# Patient Record
Sex: MALE | Race: BLACK OR AFRICAN AMERICAN | NOT HISPANIC OR LATINO | Employment: FULL TIME | URBAN - METROPOLITAN AREA
[De-identification: names, ages, dates, MRNs, and addresses within clinical notes are randomized per-mention and may not be internally consistent; named-entity substitution may affect disease eponyms.]

---

## 2017-10-19 ENCOUNTER — HISTORICAL (OUTPATIENT)
Dept: ADMINISTRATIVE | Facility: HOSPITAL | Age: 52
End: 2017-10-19

## 2018-01-30 LAB
INFLUENZA A ANTIGEN, POC: NEGATIVE
INFLUENZA B ANTIGEN, POC: NEGATIVE
RAPID GROUP A STREP (OHS): NEGATIVE

## 2018-03-24 LAB — RAPID GROUP A STREP (OHS): NEGATIVE

## 2020-07-06 ENCOUNTER — HISTORICAL (OUTPATIENT)
Dept: URGENT CARE | Facility: CLINIC | Age: 55
End: 2020-07-06

## 2022-04-10 ENCOUNTER — HISTORICAL (OUTPATIENT)
Dept: ADMINISTRATIVE | Facility: HOSPITAL | Age: 57
End: 2022-04-10

## 2022-04-27 VITALS
WEIGHT: 237.88 LBS | BODY MASS INDEX: 32.22 KG/M2 | DIASTOLIC BLOOD PRESSURE: 81 MMHG | SYSTOLIC BLOOD PRESSURE: 110 MMHG | HEIGHT: 72 IN | OXYGEN SATURATION: 97 %

## 2022-05-03 ENCOUNTER — HOSPITAL ENCOUNTER (OUTPATIENT)
Facility: HOSPITAL | Age: 57
Discharge: HOME | End: 2022-05-05
Attending: STUDENT IN AN ORGANIZED HEALTH CARE EDUCATION/TRAINING PROGRAM | Admitting: INTERNAL MEDICINE
Payer: COMMERCIAL

## 2022-05-03 DIAGNOSIS — I10 HYPERTENSION, UNSPECIFIED TYPE: ICD-10-CM

## 2022-05-03 DIAGNOSIS — R10.11 RUQ PAIN: ICD-10-CM

## 2022-05-03 DIAGNOSIS — R11.0 NAUSEA: ICD-10-CM

## 2022-05-03 DIAGNOSIS — T46.0X1A POISONING BY DIGOXIN, ACCIDENTAL OR UNINTENTIONAL, INITIAL ENCOUNTER: ICD-10-CM

## 2022-05-03 DIAGNOSIS — R07.9 CHEST PAIN: ICD-10-CM

## 2022-05-03 DIAGNOSIS — R10.11 RIGHT UPPER QUADRANT ABDOMINAL PAIN: Primary | ICD-10-CM

## 2022-05-03 DIAGNOSIS — T46.0X1A DIGOXIN TOXICITY: ICD-10-CM

## 2022-05-03 LAB
ALBUMIN SERPL-MCNC: 4.2 GM/DL (ref 3.5–5)
ALBUMIN/GLOB SERPL: 1.4 RATIO (ref 1.1–2)
ALP SERPL-CCNC: 40 UNIT/L (ref 40–150)
ALT SERPL-CCNC: 23 UNIT/L (ref 0–55)
APPEARANCE UR: CLEAR
AST SERPL-CCNC: 21 UNIT/L (ref 5–34)
BACTERIA #/AREA URNS AUTO: NORMAL /HPF
BASOPHILS # BLD AUTO: 0.03 X10(3)/MCL (ref 0–0.2)
BASOPHILS NFR BLD AUTO: 0.4 %
BILIRUB UR QL STRIP.AUTO: NEGATIVE MG/DL
BILIRUBIN DIRECT+TOT PNL SERPL-MCNC: 0.2 MG/DL (ref 0–0.5)
BILIRUBIN DIRECT+TOT PNL SERPL-MCNC: 0.3 MG/DL (ref 0–0.8)
BILIRUBIN DIRECT+TOT PNL SERPL-MCNC: 0.5 MG/DL
BUN SERPL-MCNC: 11.7 MG/DL (ref 8.4–25.7)
CALCIUM SERPL-MCNC: 9.5 MG/DL (ref 8.4–10.2)
CHLORIDE SERPL-SCNC: 107 MMOL/L (ref 98–107)
CO2 SERPL-SCNC: 28 MMOL/L (ref 22–29)
COLOR UR AUTO: YELLOW
CREAT SERPL-MCNC: 1.19 MG/DL (ref 0.73–1.18)
DIGOXIN SERPL-MCNC: 4.6 NG/ML (ref 0.8–2)
EOSINOPHIL # BLD AUTO: 0.31 X10(3)/MCL (ref 0–0.9)
EOSINOPHIL NFR BLD AUTO: 4.2 %
ERYTHROCYTE [DISTWIDTH] IN BLOOD BY AUTOMATED COUNT: 12.5 % (ref 11.5–17)
GLOBULIN SER-MCNC: 3 GM/DL (ref 2.4–3.5)
GLUCOSE SERPL-MCNC: 96 MG/DL (ref 74–100)
GLUCOSE UR QL STRIP.AUTO: NEGATIVE MG/DL
HCT VFR BLD AUTO: 39.2 % (ref 42–52)
HGB BLD-MCNC: 12.5 GM/DL (ref 14–18)
IMM GRANULOCYTES # BLD AUTO: 0.06 X10(3)/MCL (ref 0–0.02)
IMM GRANULOCYTES NFR BLD AUTO: 0.8 % (ref 0–0.43)
KETONES UR QL STRIP.AUTO: NEGATIVE MG/DL
LEUKOCYTE ESTERASE UR QL STRIP.AUTO: NEGATIVE UNIT/L
LIPASE SERPL-CCNC: 37 U/L
LYMPHOCYTES # BLD AUTO: 2.37 X10(3)/MCL (ref 0.6–4.6)
LYMPHOCYTES NFR BLD AUTO: 32.4 %
MCH RBC QN AUTO: 30.7 PG (ref 27–31)
MCHC RBC AUTO-ENTMCNC: 31.9 MG/DL (ref 33–36)
MCV RBC AUTO: 96.3 FL (ref 80–94)
MONOCYTES # BLD AUTO: 0.51 X10(3)/MCL (ref 0.1–1.3)
MONOCYTES NFR BLD AUTO: 7 %
NEUTROPHILS # BLD AUTO: 4 X10(3)/MCL (ref 2.1–9.2)
NEUTROPHILS NFR BLD AUTO: 55.2 %
NITRITE UR QL STRIP.AUTO: NEGATIVE
NRBC BLD AUTO-RTO: 0 %
PH UR STRIP.AUTO: 5.5 [PH]
PLATELET # BLD AUTO: 268 X10(3)/MCL (ref 130–400)
PMV BLD AUTO: 10.8 FL (ref 9.4–12.4)
POTASSIUM SERPL-SCNC: 4.2 MMOL/L (ref 3.5–5.1)
PROT SERPL-MCNC: 7.2 GM/DL (ref 6.4–8.3)
PROT UR QL STRIP.AUTO: NEGATIVE MG/DL
RBC # BLD AUTO: 4.07 X10(6)/MCL (ref 4.7–6.1)
RBC #/AREA URNS AUTO: 0 /HPF
RBC UR QL AUTO: NEGATIVE UNIT/L
SODIUM SERPL-SCNC: 140 MMOL/L (ref 136–145)
SP GR UR STRIP.AUTO: 1.01 (ref 1–1.03)
SQUAMOUS #/AREA URNS AUTO: 0 /LPF
TROPONIN I SERPL-MCNC: <0.01 NG/ML (ref 0–0.04)
TROPONIN I SERPL-MCNC: <0.01 NG/ML (ref 0–0.04)
UROBILINOGEN UR STRIP-ACNC: 0.2 MG/DL
WBC # SPEC AUTO: 7.3 X10(3)/MCL (ref 4.5–11.5)
WBC #/AREA URNS AUTO: 0 /HPF

## 2022-05-03 PROCEDURE — G0378 HOSPITAL OBSERVATION PER HR: HCPCS

## 2022-05-03 PROCEDURE — 25000003 PHARM REV CODE 250

## 2022-05-03 PROCEDURE — 93010 ELECTROCARDIOGRAM REPORT: CPT | Mod: ,,, | Performed by: INTERNAL MEDICINE

## 2022-05-03 PROCEDURE — 84484 ASSAY OF TROPONIN QUANT: CPT | Performed by: PHYSICIAN ASSISTANT

## 2022-05-03 PROCEDURE — 81003 URINALYSIS AUTO W/O SCOPE: CPT | Performed by: NURSE PRACTITIONER

## 2022-05-03 PROCEDURE — 80053 COMPREHEN METABOLIC PANEL: CPT | Performed by: NURSE PRACTITIONER

## 2022-05-03 PROCEDURE — 93005 ELECTROCARDIOGRAM TRACING: CPT

## 2022-05-03 PROCEDURE — 25000003 PHARM REV CODE 250: Performed by: PHYSICIAN ASSISTANT

## 2022-05-03 PROCEDURE — 83690 ASSAY OF LIPASE: CPT | Performed by: NURSE PRACTITIONER

## 2022-05-03 PROCEDURE — 99285 EMERGENCY DEPT VISIT HI MDM: CPT | Mod: 25

## 2022-05-03 PROCEDURE — 81015 MICROSCOPIC EXAM OF URINE: CPT | Performed by: NURSE PRACTITIONER

## 2022-05-03 PROCEDURE — 36415 COLL VENOUS BLD VENIPUNCTURE: CPT | Performed by: NURSE PRACTITIONER

## 2022-05-03 PROCEDURE — 11000001 HC ACUTE MED/SURG PRIVATE ROOM

## 2022-05-03 PROCEDURE — 93010 EKG 12-LEAD: ICD-10-PCS | Mod: ,,, | Performed by: INTERNAL MEDICINE

## 2022-05-03 PROCEDURE — 85025 COMPLETE CBC W/AUTO DIFF WBC: CPT | Performed by: NURSE PRACTITIONER

## 2022-05-03 PROCEDURE — 80162 ASSAY OF DIGOXIN TOTAL: CPT | Performed by: PHYSICIAN ASSISTANT

## 2022-05-03 RX ORDER — ONDANSETRON 2 MG/ML
4 INJECTION INTRAMUSCULAR; INTRAVENOUS
Status: ACTIVE | OUTPATIENT
Start: 2022-05-03 | End: 2022-05-04

## 2022-05-03 RX ORDER — ONDANSETRON 2 MG/ML
4 INJECTION INTRAMUSCULAR; INTRAVENOUS EVERY 8 HOURS PRN
Status: DISCONTINUED | OUTPATIENT
Start: 2022-05-03 | End: 2022-05-05 | Stop reason: HOSPADM

## 2022-05-03 RX ORDER — LISINOPRIL AND HYDROCHLOROTHIAZIDE 12.5; 2 MG/1; MG/1
1 TABLET ORAL DAILY
COMMUNITY
Start: 2022-04-15

## 2022-05-03 RX ORDER — ACETAMINOPHEN 325 MG/1
650 TABLET ORAL EVERY 8 HOURS PRN
Status: DISCONTINUED | OUTPATIENT
Start: 2022-05-03 | End: 2022-05-05 | Stop reason: HOSPADM

## 2022-05-03 RX ORDER — SUCRALFATE 1 G/1
1 TABLET ORAL 4 TIMES DAILY
Qty: 16 TABLET | Refills: 0 | Status: SHIPPED | OUTPATIENT
Start: 2022-05-03 | End: 2022-05-03

## 2022-05-03 RX ORDER — ONDANSETRON 4 MG/1
TABLET, ORALLY DISINTEGRATING ORAL
Status: COMPLETED
Start: 2022-05-03 | End: 2022-05-03

## 2022-05-03 RX ORDER — DIGOXIN 250 MCG
0.25 TABLET ORAL DAILY
Status: ON HOLD | COMMUNITY
Start: 2022-04-15 | End: 2022-05-05 | Stop reason: HOSPADM

## 2022-05-03 RX ORDER — HYDROCODONE BITARTRATE AND ACETAMINOPHEN 5; 325 MG/1; MG/1
1 TABLET ORAL EVERY 4 HOURS PRN
Status: DISCONTINUED | OUTPATIENT
Start: 2022-05-03 | End: 2022-05-05 | Stop reason: HOSPADM

## 2022-05-03 RX ORDER — LISINOPRIL 10 MG/1
20 TABLET ORAL
Status: COMPLETED | OUTPATIENT
Start: 2022-05-03 | End: 2022-05-03

## 2022-05-03 RX ORDER — PANTOPRAZOLE SODIUM 40 MG/1
TABLET, DELAYED RELEASE ORAL
Status: COMPLETED
Start: 2022-05-03 | End: 2022-05-03

## 2022-05-03 RX ORDER — ONDANSETRON 4 MG/1
4 TABLET, FILM COATED ORAL EVERY 6 HOURS
Qty: 12 TABLET | Refills: 0 | Status: SHIPPED | OUTPATIENT
Start: 2022-05-03 | End: 2022-05-03

## 2022-05-03 RX ORDER — SODIUM CHLORIDE 0.9 % (FLUSH) 0.9 %
10 SYRINGE (ML) INJECTION
Status: DISCONTINUED | OUTPATIENT
Start: 2022-05-03 | End: 2022-05-05 | Stop reason: HOSPADM

## 2022-05-03 RX ORDER — HYDROCHLOROTHIAZIDE 25 MG/1
25 TABLET ORAL
Status: COMPLETED | OUTPATIENT
Start: 2022-05-03 | End: 2022-05-03

## 2022-05-03 RX ORDER — PANTOPRAZOLE SODIUM 40 MG/10ML
40 INJECTION, POWDER, LYOPHILIZED, FOR SOLUTION INTRAVENOUS
Status: ACTIVE | OUTPATIENT
Start: 2022-05-03 | End: 2022-05-04

## 2022-05-03 RX ORDER — RANOLAZINE 1000 MG/1
1000 TABLET, EXTENDED RELEASE ORAL 2 TIMES DAILY
COMMUNITY
Start: 2022-04-16

## 2022-05-03 RX ORDER — PROMETHAZINE HYDROCHLORIDE 25 MG/1
25 TABLET ORAL EVERY 6 HOURS PRN
Status: DISCONTINUED | OUTPATIENT
Start: 2022-05-03 | End: 2022-05-05 | Stop reason: HOSPADM

## 2022-05-03 RX ORDER — PANTOPRAZOLE SODIUM 40 MG/1
40 TABLET, DELAYED RELEASE ORAL
Status: COMPLETED | OUTPATIENT
Start: 2022-05-03 | End: 2022-05-03

## 2022-05-03 RX ORDER — ATORVASTATIN CALCIUM 20 MG/1
TABLET, FILM COATED ORAL
COMMUNITY

## 2022-05-03 RX ORDER — TALC
6 POWDER (GRAM) TOPICAL NIGHTLY PRN
Status: DISCONTINUED | OUTPATIENT
Start: 2022-05-03 | End: 2022-05-05 | Stop reason: HOSPADM

## 2022-05-03 RX ADMIN — HYDROCHLOROTHIAZIDE 25 MG: 25 TABLET ORAL at 06:05

## 2022-05-03 RX ADMIN — PANTOPRAZOLE SODIUM 40 MG: 40 TABLET, DELAYED RELEASE ORAL at 03:05

## 2022-05-03 RX ADMIN — LISINOPRIL 20 MG: 10 TABLET ORAL at 06:05

## 2022-05-03 RX ADMIN — ONDANSETRON 4 MG: 4 TABLET, ORALLY DISINTEGRATING ORAL at 03:05

## 2022-05-03 NOTE — ED PROVIDER NOTES
Encounter Date: 5/3/2022       History     Chief Complaint   Patient presents with    Abdominal Pain     Pt to ER via POV for abd pain.  Started 6 months ago.  No other complaints     Patient states abdominal pain x 4-5 months. States nausea. Denies any vomiting or diarrhea (sima, GERALDINE).     Clint ARIAS PA-C, assume care of the patient. 57 yo male presents to ED for evaluation for RUQ/rib pain for the past 5-6 months. Reports intermittent nausea without vomiting or diarrhea. States been unable to take his meds due to nausea. Hx of HTN and Afib currently on digoxin.     The history is provided by the patient.     Review of patient's allergies indicates:  No Known Allergies  No past medical history on file.  No past surgical history on file.  No family history on file.     Review of Systems   Constitutional: Negative for chills, fatigue, fever and unexpected weight change.   HENT: Negative.    Respiratory: Negative for choking, chest tightness and shortness of breath.    Cardiovascular: Positive for chest pain. Negative for palpitations.   Gastrointestinal: Positive for abdominal pain and vomiting. Negative for constipation and diarrhea.   Genitourinary: Negative for dysuria, frequency and hematuria.   Musculoskeletal: Positive for arthralgias. Negative for back pain, joint swelling and myalgias.   Skin: Negative.    Neurological: Negative for dizziness, light-headedness and headaches.       Physical Exam     Initial Vitals [05/03/22 0924]   BP Pulse Resp Temp SpO2   (!) 157/95 60 18 97.9 °F (36.6 °C) 99 %      MAP       --         Physical Exam    Constitutional: He appears well-developed and well-nourished.   HENT:   Head: Normocephalic and atraumatic.   Eyes: Conjunctivae and EOM are normal. Pupils are equal, round, and reactive to light.   Neck: Neck supple.   Normal range of motion.  Cardiovascular: Normal rate, regular rhythm and normal heart sounds.   Pulmonary/Chest: He exhibits no mass, no deformity, no  swelling and no retraction.   Right lower chest wall pain on palpation.   Abdominal: Abdomen is soft. Bowel sounds are normal. There is abdominal tenderness in the right upper quadrant. There is no rebound, no guarding and negative Ring's sign.   Musculoskeletal:         General: Normal range of motion.      Cervical back: Normal range of motion and neck supple.     Neurological: He is alert. GCS score is 15. GCS eye subscore is 4. GCS verbal subscore is 5. GCS motor subscore is 6.   Skin: Skin is warm and dry.         ED Course   Procedures  Labs Reviewed   COMPREHENSIVE METABOLIC PANEL - Abnormal; Notable for the following components:       Result Value    Creatinine 1.19 (*)     All other components within normal limits   CBC WITH DIFFERENTIAL - Abnormal; Notable for the following components:    RBC 4.07 (*)     Hgb 12.5 (*)     Hct 39.2 (*)     MCV 96.3 (*)     MCHC 31.9 (*)     IG# 0.06 (*)     IG% 0.8 (*)     All other components within normal limits   DIGOXIN LEVEL - Abnormal; Notable for the following components:    Digoxin Level 4.60 (*)     All other components within normal limits   LIPASE - Normal   URINALYSIS, REFLEX TO URINE CULTURE - Normal   TROPONIN I - Normal   CBC W/ AUTO DIFFERENTIAL    Narrative:     The following orders were created for panel order CBC Auto Differential.  Procedure                               Abnormality         Status                     ---------                               -----------         ------                     CBC with Differential[[id number]]        Abnormal            Final result                 Please view results for these tests on the individual orders.   URINALYSIS, MICROSCOPIC     EKG Readings: (Independently Interpreted)   Initial Reading: No STEMI. Previous EKG: Compared with most recent EKG Rhythm: Sinus Bradycardia. Ectopy: No Ectopy. Conduction: 1st Degree AV Block. ST Segments: Normal ST Segments. T Waves: Normal. Clinical Impression: Sinus  Bradycardia and AV Block - 1st Degree       Imaging Results          US Abdomen Limited (Final result)  Result time 05/03/22 15:44:25    Final result by Chito Hernandez MD (05/03/22 15:44:25)                 Impression:      No sonographic abnormality of the right upper quadrant.      Electronically signed by: Chito Hernandez  Date:    05/03/2022  Time:    15:44             Narrative:    EXAMINATION:  US ABDOMEN LIMITED    CLINICAL HISTORY:  RUQ pain;    COMPARISON:  No previous studies are available for comparison.    FINDINGS:  Grayscale, color and spectral doppler evaluation of the right upper quadrant.    The pancreas is obscured.  Imaged portions of aorta and IVC normal in caliber.    Liver is not significantly enlarged. No focal liver lesion. Normal hepatopetal flow is noted in the portal vein.    No gallstones. No significant gallbladder wall thickening or pericholecystic fluid.  The common bile duct is normal in caliber  and measures 3 mm.    Right kidney measures 12 cm in length. No hydronephrosis.                               X-Ray Chest 1 View (Final result)  Result time 05/03/22 15:09:21    Final result by Yecenia Camejo MD (05/03/22 15:09:21)                 Impression:      No acute abnormality identified.      Electronically signed by: Yecenia Camejo  Date:    05/03/2022  Time:    15:09             Narrative:    EXAMINATION:  XR CHEST 1 VIEW    CLINICAL HISTORY:  Chest pain, unspecified    COMPARISON:  None.    FINDINGS:  The heart is normal in size.  The lungs are clear without focal consolidation.  There is no pleural effusion or visible pneumothorax.                                 Medications   ondansetron injection 4 mg (4 mg Intravenous Not Given 5/3/22 1430)   pantoprazole injection 40 mg (40 mg Intravenous Not Given 5/3/22 1430)   ondansetron (ZOFRAN-ODT) 4 MG disintegrating tablet (4 mg Oral Given 5/3/22 1551)   pantoprazole EC tablet 40 mg (40 mg Oral Given 5/3/22 1553)   lisinopriL  tablet 20 mg (20 mg Oral Given 5/3/22 1815)   hydroCHLOROthiazide tablet 25 mg (25 mg Oral Given 5/3/22 1815)     Medical Decision Making:   Clinical Tests:   Lab Tests: Ordered and Reviewed  The following lab test(s) were unremarkable: CBC, CMP, Troponin, Lipase and Urinalysis       <> Summary of Lab: Basic labs unremarkable. Digoxin level of 4.6 critical high.  Radiological Study: Ordered and Reviewed  Medical Tests: Ordered and Reviewed             ED Course as of 05/03/22 1859 Tue May 03, 2022   1757 Digoxin Lvl(!!): 4.60  Discussed with Dr. Jones, ED attending, critical digoxin labs. Possible cause of symptoms. Recommends to discuss with Dr. Schulz, cardiology for recommendations. [SL]   1821 Discussed with GERALDINE Stack, with cardiology. Recommends holding digoxin. Admit to medicine with possible HIDA scan in AM. Will see in consult.  [SL]   1829 Discussed with Dr. Bae. Will admit. Get HIDA scan in AM.  [SL]      ED Course User Index  [SL] CARMEN Orozco             Clinical Impression:   Final diagnoses:  [R07.9] Chest pain  [I10] Hypertension, unspecified type (Primary)  [R11.0] Nausea  [R10.11] RUQ pain  [T46.0X1A] Poisoning by digoxin, accidental or unintentional, initial encounter  [T46.0X1A] Digoxin toxicity          ED Disposition Condition    Admit               CARMEN Orozco  05/03/22 1912

## 2022-05-04 PROBLEM — R10.11 RIGHT UPPER QUADRANT ABDOMINAL PAIN: Status: ACTIVE | Noted: 2022-05-04

## 2022-05-04 LAB
BASOPHILS # BLD AUTO: 0.03 X10(3)/MCL (ref 0–0.2)
BASOPHILS NFR BLD AUTO: 0.4 %
EOSINOPHIL # BLD AUTO: 0.35 X10(3)/MCL (ref 0–0.9)
EOSINOPHIL NFR BLD AUTO: 4.8 %
ERYTHROCYTE [DISTWIDTH] IN BLOOD BY AUTOMATED COUNT: 12.9 % (ref 11.5–17)
HCT VFR BLD AUTO: 42 % (ref 42–52)
HGB BLD-MCNC: 13.3 GM/DL (ref 14–18)
IMM GRANULOCYTES # BLD AUTO: 0.05 X10(3)/MCL (ref 0–0.02)
IMM GRANULOCYTES NFR BLD AUTO: 0.7 % (ref 0–0.43)
LYMPHOCYTES # BLD AUTO: 2.22 X10(3)/MCL (ref 0.6–4.6)
LYMPHOCYTES NFR BLD AUTO: 30.5 %
MCH RBC QN AUTO: 31.1 PG (ref 27–31)
MCHC RBC AUTO-ENTMCNC: 31.7 MG/DL (ref 33–36)
MCV RBC AUTO: 98.1 FL (ref 80–94)
MONOCYTES # BLD AUTO: 0.58 X10(3)/MCL (ref 0.1–1.3)
MONOCYTES NFR BLD AUTO: 8 %
NEUTROPHILS # BLD AUTO: 4.1 X10(3)/MCL (ref 2.1–9.2)
NEUTROPHILS NFR BLD AUTO: 55.6 %
NRBC BLD AUTO-RTO: 0 %
PLATELET # BLD AUTO: 252 X10(3)/MCL (ref 130–400)
PMV BLD AUTO: 11.5 FL (ref 9.4–12.4)
RBC # BLD AUTO: 4.28 X10(6)/MCL (ref 4.7–6.1)
TROPONIN I SERPL-MCNC: <0.01 NG/ML (ref 0–0.04)
WBC # SPEC AUTO: 7.3 X10(3)/MCL (ref 4.5–11.5)

## 2022-05-04 PROCEDURE — 85025 COMPLETE CBC W/AUTO DIFF WBC: CPT | Performed by: PHYSICIAN ASSISTANT

## 2022-05-04 PROCEDURE — 36415 COLL VENOUS BLD VENIPUNCTURE: CPT | Performed by: PHYSICIAN ASSISTANT

## 2022-05-04 PROCEDURE — 25000003 PHARM REV CODE 250: Performed by: INTERNAL MEDICINE

## 2022-05-04 PROCEDURE — G0378 HOSPITAL OBSERVATION PER HR: HCPCS

## 2022-05-04 PROCEDURE — 11000001 HC ACUTE MED/SURG PRIVATE ROOM

## 2022-05-04 PROCEDURE — 84484 ASSAY OF TROPONIN QUANT: CPT | Performed by: PHYSICIAN ASSISTANT

## 2022-05-04 RX ORDER — ATORVASTATIN CALCIUM 40 MG/1
40 TABLET, FILM COATED ORAL NIGHTLY
Status: DISCONTINUED | OUTPATIENT
Start: 2022-05-04 | End: 2022-05-05 | Stop reason: HOSPADM

## 2022-05-04 RX ORDER — RANOLAZINE 500 MG/1
1000 TABLET, EXTENDED RELEASE ORAL 2 TIMES DAILY
Status: DISCONTINUED | OUTPATIENT
Start: 2022-05-04 | End: 2022-05-05 | Stop reason: HOSPADM

## 2022-05-04 RX ORDER — NAPROXEN SODIUM 220 MG/1
81 TABLET, FILM COATED ORAL DAILY
Status: DISCONTINUED | OUTPATIENT
Start: 2022-05-04 | End: 2022-05-05 | Stop reason: HOSPADM

## 2022-05-04 RX ADMIN — ATORVASTATIN CALCIUM 40 MG: 40 TABLET, FILM COATED ORAL at 08:05

## 2022-05-04 RX ADMIN — RANOLAZINE 1000 MG: 500 TABLET, EXTENDED RELEASE ORAL at 12:05

## 2022-05-04 RX ADMIN — ASPIRIN 81 MG CHEWABLE TABLET 81 MG: 81 TABLET CHEWABLE at 12:05

## 2022-05-04 RX ADMIN — RANOLAZINE 1000 MG: 500 TABLET, EXTENDED RELEASE ORAL at 08:05

## 2022-05-04 NOTE — ED NOTES
"Assumed care for pt at this time. Pt presents to ed with RUQ abd pain that started 6 months ago. Pt states he has hx of A fib, states he takes digoxin. Pt states his "normal HR is lower 50s". Pt in NAD, AAOx4. Family at bedside. Cardiac-respiratory monitors in progress  "

## 2022-05-04 NOTE — HISTORICAL [facility]
This is a historical note converted from Charles. Formatting and pictures may have been removed.  Please reference Charles for original formatting and attached multimedia. Chief Complaint  doing leg press & felt a pop on Rt Flank x 4 days  History of Present Illness  Patient is a 52-year-old male doing a leg press?felt and felt?pop in his right flank  Review of Systems  Constitutional_no fever, fatigue, weakness  Cardiovascular_no chest pain, tachycardia, bradycardia, arrhythmia  Respiratory_no shortness of breath, cough, wheezing, rhonchi  Musculoskeletal_[right flank pain]  Integumentary_no skin rash or abnormal lesion  Neurologic_no headache, no dizziness, no weakness or numbness  ?  Physical Exam  Vitals & Measurements  T:?36.7? ?C ?(Oral)? HR:?65?(Peripheral)? RR:?18? BP:?145/97? SpO2:?96%?  HT:?182?cm? HT:?182?cm? WT:?106.5?kg? WT:?106.5?kg? BMI:?32.15?  VITAL SIGNS: ?Reviewed. ? ?  GENERAL:? In no apparent distress.?  CHEST:? Chest with clear breath sounds bilaterally.? No wheezes, rales, or rhonchi.?  CARDIAC:? Regular rate and rhythm.? S1 and S2, without murmurs, gallops, or rubs.  ABDOMEN:? Soft, without detectable tenderness.? No sign of distention.? No?? rebound or guarding, and no masses palpated.?? Bowel Sounds normal.  MUSCULOSKELETAL: Right rib pain with?increased respiration and palpation near the 11-12?rib area all major joints. Extremities without clubbing, cyanosis or edema.?  NEUROLOGIC EXAM:? Alert and oriented x 3.? No focal sensory or strength deficits.?? Speech normal.? Follows commands.  Assessment/Plan  1.?Rib pain on right side  Ordered:  diclofenac, 75 mg = 1 tab(s), Oral, BID, # 14 tab(s), 0 Refill(s), Pharmacy: [facility]/pharmacy #[id number]  ketorolac, 60 mg, IM, Once-Unscheduled, Order duration: 5 day(s), first dose 10/19/17 8:54:00 CDT, stop date 10/24/17 8:53:00 CDT, 160,101  XR Ribs Right 2 Views, Routine, 10/19/17 8:32:00 CDT, Trauma, None, Ambulatory, Rad Type, Rib pain on right side, Not  Scheduled, 10/19/17 8:32:00 CDT  ?  2.?Constipation  ?  3.?Costochondritis  ?  Instructed patient to take diclofenac 75 mg p.o. twice daily??7 days, if symptom persists?follow-up with PCP or return to clinic.  Instructed patient to take?MiraLAX 1 capful with 8 ounces of water twice daily??1 week,?starting Saturday?drink one bottle of magnesium citrate?weight 3 hours if no response repeat again until?patient has?a large amount of?evacuated stool.  Instructed patient symptom persists greater than 5 7 days return to clinic or follow-up with PCP.   Problem List/Past Medical History  Ongoing  A-fib  HTN - Hypertension  Obesity  Historical  Procedure/Surgical History  None  Medications  ATORVASTATIN 20 MG TABLET, 20 mg= 1 tab(s), Oral, Daily  diclofenac sodium 75 mg oral delayed release tablet, 75 mg= 1 tab(s), Oral, BID  DIGOXIN 250 MCG TABLET, 250 mcg= 1 tab(s), Oral, Daily  LISINOPRIL-HCTZ 20-12.5 MG TAB, 1 tab(s), Oral, Daily  TIZANIDINE HCL 4 MG TABLET  Toradol, 60 mg, IM, Once-Unscheduled  Allergies  No Known Medication Allergies  Social History  Alcohol - 10/19/2017  Current, 1-2 times per month  Tobacco - 10/19/2017  Never smoker  Family History  Brain tumor.: Mother.  Prostate cancer.: Father.  Tobacco user.: Mother and Father.

## 2022-05-04 NOTE — H&P
[facility] - [facility]    History & Physical      Patient Name: Sudheer Patricia  MRN: [id number]  Admission Date: 5/3/2022  Attending Physician: Catalino Love MD   Primary Care Provider: Primary Doctor No         Patient information was obtained from patient and ER records.     Subjective:     Principal Problem:Right upper quadrant abdominal pain    Chief Complaint:   Chief Complaint   Patient presents with    Abdominal Pain     Pt to ER via POV for abd pain.  Started 6 months ago.  No other complaints        HPI: 57 y/o male with history of htn hld anxiety afib cad presented to the ed with complaints of worseniing abd pain and nausea with weakness and further work up suggestive of dig toxicity and further admitted for close monitoring and further work up     No past medical history on file.    No past surgical history on file.    Review of patient's allergies indicates:  No Known Allergies    No current facility-administered medications on file prior to encounter.     Current Outpatient Medications on File Prior to Encounter   Medication Sig    aspirin 162.5 mg Cp24 aspirin    atorvastatin (LIPITOR) 20 MG tablet atorvastatin 20 mg tablet   TAKE 1 TABLET BY MOUTH EVERY DAY    digoxin (LANOXIN) 250 mcg tablet Take 0.25 mg by mouth once daily.    lisinopriL-hydrochlorothiazide (PRINZIDE,ZESTORETIC) 20-12.5 mg per tablet Take 1 tablet by mouth once daily.    ranolazine (RANEXA) 1,000 mg Tb12 Take 1,000 mg by mouth 2 (two) times daily.     Family History    None       Tobacco Use    Smoking status: Not on file    Smokeless tobacco: Not on file   Substance and Sexual Activity    Alcohol use: Not on file    Drug use: Not on file    Sexual activity: Not on file     Review of Systems   Constitutional: Negative.    HENT: Negative.    Eyes: Negative.    Respiratory: Positive for chest tightness and shortness of breath.    Cardiovascular: Negative.    Gastrointestinal: Positive for  abdominal pain and nausea.   Endocrine: Negative.    Genitourinary: Negative.    Musculoskeletal: Negative.    Skin: Negative.    Allergic/Immunologic: Negative.    Neurological: Positive for weakness.   Psychiatric/Behavioral: Negative.      Objective:     Vital Signs (Most Recent):  Temp: 97.9 °F (36.6 °C) (05/04/22 0720)  Pulse: 60 (05/04/22 0720)  Resp: 19 (05/04/22 0720)  BP: 129/76 (05/04/22 0720)  SpO2: 97 % (05/04/22 0720) Vital Signs (24h Range):  Temp:  [97.7 °F (36.5 °C)-98.1 °F (36.7 °C)] 97.9 °F (36.6 °C)  Pulse:  [50-77] 60  Resp:  [12-20] 19  SpO2:  [71 %-100 %] 97 %  BP: (127-161)/() 129/76     Weight: 122.2 kg (269 lb 6.4 oz)  Body mass index is 35.54 kg/m².    Physical Exam  Vitals reviewed.   Constitutional:       Appearance: Normal appearance.   HENT:      Head: Normocephalic and atraumatic.      Right Ear: Tympanic membrane and external ear normal.      Nose: Nose normal.      Mouth/Throat:      Mouth: Mucous membranes are moist.   Eyes:      Extraocular Movements: Extraocular movements intact.      Pupils: Pupils are equal, round, and reactive to light.   Cardiovascular:      Rate and Rhythm: Normal rate and regular rhythm.      Pulses: Normal pulses.      Heart sounds: Normal heart sounds.   Pulmonary:      Effort: Pulmonary effort is normal.      Breath sounds: Normal breath sounds.   Abdominal:      General: Abdomen is flat. Bowel sounds are normal.      Palpations: Abdomen is soft.      Tenderness: There is abdominal tenderness.   Musculoskeletal:         General: Normal range of motion.      Cervical back: Normal range of motion and neck supple.   Skin:     General: Skin is warm and dry.   Neurological:      General: No focal deficit present.      Mental Status: He is alert and oriented to person, place, and time.   Psychiatric:         Mood and Affect: Mood normal.         Behavior: Behavior normal.           CRANIAL NERVES     CN III, IV, VI   Pupils are equal, round, and reactive  to light.      Significant Labs: All pertinent labs within the past 24 hours have been reviewed.  Lab Results   Component Value Date    WBC 7.3 05/04/2022    HGB 13.3 (L) 05/04/2022    HCT 42.0 05/04/2022    MCV 98.1 (H) 05/04/2022       Recent Labs   Lab 05/03/22  0951   CO2 28   BUN 11.7   CREATININE 1.19*   GLUCOSE 96   CALCIUM 9.5         Significant Imaging: I have reviewed all pertinent imaging results/findings within the past 24 hours.    Assessment/Plan:     Active Diagnoses:    Diagnosis Date Noted POA    PRINCIPAL PROBLEM:  Right upper quadrant abdominal pain [R10.11] 05/04/2022 Yes      Problems Resolved During this Admission:     VTE Risk Mitigation (From admission, onward)         Ordered     IP VTE LOW RISK PATIENT  Once         05/03/22 1911     Place RICK hose  Until discontinued         05/03/22 1911              Dig toxicity  intractible nausea  Abdominal discomfort  htn  hld  afib    Plan :  Tele  hida scan  Hold dig  Labs in am  Adv diet as liam  Cards consult  Resume home meds  Gi and dvt ppx  Code status full    Catalino Love MD  Department of Hospital Medicine   [facility] - [facility]

## 2022-05-04 NOTE — CONSULTS
Cardiovascular Admission H&P    Patient Name: Sudheer Patricia  Age: 56 y.o.  : 1965  MRN: [id number]  Admission Date: 5/3/2022  Primary Cardiologist: Dr. Mohan Coleman  ?  Chief Complaint:   Chief Complaint   Patient presents with    Abdominal Pain     Pt to ER via POV for abd pain.  Started 6 months ago.  No other complaints       History of Present Illness:  Sudheer Patricia is a 56 y.o. male, patient of Dr. Coleman with history of PAF on digoxin and aspirin, HLD, HTN presented to ER with complaints of abdominal pain, intermittent nausea/vomiting and admitted for further evaluation and treatment. At time of rounding, patient is in [place] for HIDA scan, therefore chart reviewed and discussed with  Nursing. Abdominal US has been completed with no acute changes noted. During workup, labs noted digoxin level of 4.6.   Patient denied any chest pain, no syncope or near syncope, no changes to vision, no palpitations, no fever/chills. Per nursing no mental status changes reported or noted, no constipation or diarrhea. He has been maintaining 02 sats on room air and vital signs are stable. EKG/tele with NSR to sinus jenifer (patient history of HR 50s). Cardiology consulted due to the digoxin toxicity.          Review of Systems:  Review of Systems - 12 point review of systems was performed and reviewed with the patient and was negative except as indicated in the History of Present Illness.    Health Status  Review of patient's allergies indicates:  No Known Allergies    Past medical history:  HTN, HLD, AV block, sinus bradycardia, PAF    Current Facility-Administered Medications   Medication Dose Route Frequency Provider Last Rate Last Admin    acetaminophen tablet 650 mg  650 mg Oral Q8H PRN Catalino Love MD        aspirin chewable tablet 81 mg  81 mg Oral Daily Catalino Love MD        atorvastatin tablet 40 mg  40 mg Oral QHS Catalino Love MD        HYDROcodone-acetaminophen 5-325 mg per tablet 1 tablet   1 tablet Oral Q4H PRN Catalino Love MD        melatonin tablet 6 mg  6 mg Oral Nightly PRN Catalino Love MD        ondansetron injection 4 mg  4 mg Intravenous Q8H PRN Catalino Love MD        promethazine tablet 25 mg  25 mg Oral Q6H PRN Catalino Love MD        ranolazine 12 hr tablet 1,000 mg  1,000 mg Oral BID Catalino Love MD        sodium chloride 0.9% flush 10 mL  10 mL Intravenous PRN Catalino Love MD           Family history reviewed and noncontributory to this admission    No past surgical history on file.    Social History     Socioeconomic History    Marital status:        Physical Examination:  Vital signs:  Temp:  [97.7 °F (36.5 °C)-98.1 °F (36.7 °C)] 97.9 °F (36.6 °C)  Pulse:  [50-77] 60  Resp:  [12-20] 19  SpO2:  [71 %-100 %] 97 %  BP: (127-161)/() 129/76  Patient Vitals for the past 8 hrs:   BP Temp Temp src Pulse Resp SpO2   05/04/22 0720 129/76 97.9 °F (36.6 °C) Oral 60 19 97 %   05/04/22 0300 127/77 97.9 °F (36.6 °C) Oral (!) 57 16 96 %        Recent Results (from the past 24 hour(s))   Troponin I    Collection Time: 05/03/22  8:31 PM   Result Value Ref Range    Troponin-I <0.010 0.000 - 0.045 ng/mL   Troponin I    Collection Time: 05/04/22  2:37 AM   Result Value Ref Range    Troponin-I <0.010 0.000 - 0.045 ng/mL   CBC with Differential    Collection Time: 05/04/22  2:37 AM   Result Value Ref Range    WBC 7.3 4.5 - 11.5 x10(3)/mcL    RBC 4.28 (L) 4.70 - 6.10 x10(6)/mcL    Hgb 13.3 (L) 14.0 - 18.0 gm/dL    Hct 42.0 42.0 - 52.0 %    MCV 98.1 (H) 80.0 - 94.0 fL    MCH 31.1 (H) 27.0 - 31.0 pg    MCHC 31.7 (L) 33.0 - 36.0 mg/dL    RDW 12.9 11.5 - 17.0 %    Platelet 252 130 - 400 x10(3)/mcL    MPV 11.5 9.4 - 12.4 fL    Neutro Auto 55.6 %    Lymph Auto 30.5 %    Mono Auto 8.0 %    Eos Auto 4.8 %    Basophil Auto 0.4 %    Abs Lymph 2.22 0.6 - 4.6 x10(3)/mcL    Abs Neutro 4.1 2.1 - 9.2 x10(3)/mcL    Abs Mono 0.58 0.1 - 1.3 x10(3)/mcL    Abs Eos 0.35 0  - 0.9 x10(3)/mcL    Abs Baso 0.03 0 - 0.2 x10(3)/mcL    IG# 0.05 (H) 0 - 0.0155 x10(3)/mcL    IG% 0.7 (H) 0 - 0.43 %    NRBC% 0.0 %     [unfilled]  Wt Readings from Last 3 Encounters:   05/03/22 122.2 kg (269 lb 6.4 oz)   07/06/20 107.9 kg (237 lb 14 oz)       Physical Exam   To follow per MD on rounding when patient returns from imaging          Assessment/Plan:  Abdominal pain/nausea/vomiting- workup in progress per primary, > related to dig toxicity, US negative for acute changes, HIDA in process, no AMS, no palpitations, HR bradycardia as baseline noted    Digoxin toxicity with dig level 4.6- hold digoxin, monitor    PAF on digoxin, bblocker, and aspirin  --echo from 7/31/20 with normal EF, mild MR?TR  --continue bblocker and aspirin when po resumed  --EKG/tele with NSR to sinus jenifer, continue to monitor  --patient history with HR in 50s  --hold digoxin    HTN- adjust therapy as needed  HLD- on statin        Discussed with nursing  Further review and plan per Dr. Jaerd Scott, NP-C  [facility]

## 2022-05-05 VITALS
DIASTOLIC BLOOD PRESSURE: 93 MMHG | HEART RATE: 55 BPM | OXYGEN SATURATION: 97 % | HEIGHT: 73 IN | WEIGHT: 269.38 LBS | BODY MASS INDEX: 35.7 KG/M2 | TEMPERATURE: 98 F | SYSTOLIC BLOOD PRESSURE: 145 MMHG | RESPIRATION RATE: 16 BRPM

## 2022-05-05 PROBLEM — R10.11 RIGHT UPPER QUADRANT ABDOMINAL PAIN: Status: RESOLVED | Noted: 2022-05-04 | Resolved: 2022-05-05

## 2022-05-05 PROCEDURE — 25500020 PHARM REV CODE 255: Performed by: INTERNAL MEDICINE

## 2022-05-05 PROCEDURE — G0378 HOSPITAL OBSERVATION PER HR: HCPCS

## 2022-05-05 PROCEDURE — 25000003 PHARM REV CODE 250: Performed by: INTERNAL MEDICINE

## 2022-05-05 PROCEDURE — 94761 N-INVAS EAR/PLS OXIMETRY MLT: CPT

## 2022-05-05 RX ORDER — DICYCLOMINE HYDROCHLORIDE 20 MG/1
20 TABLET ORAL EVERY 6 HOURS
Qty: 120 TABLET | Refills: 0 | Status: SHIPPED | OUTPATIENT
Start: 2022-05-05 | End: 2022-06-04

## 2022-05-05 RX ORDER — PROMETHAZINE HYDROCHLORIDE 25 MG/1
25 TABLET ORAL EVERY 6 HOURS PRN
Qty: 30 TABLET | Refills: 0 | Status: SHIPPED | OUTPATIENT
Start: 2022-05-05

## 2022-05-05 RX ADMIN — ASPIRIN 81 MG CHEWABLE TABLET 81 MG: 81 TABLET CHEWABLE at 09:05

## 2022-05-05 RX ADMIN — IOPAMIDOL 100 ML: 755 INJECTION, SOLUTION INTRAVENOUS at 04:05

## 2022-05-05 RX ADMIN — RANOLAZINE 1000 MG: 500 TABLET, EXTENDED RELEASE ORAL at 09:05

## 2022-05-05 NOTE — DISCHARGE SUMMARY
[address] [facility]  Discharge Summary      Patient Name: Sudheer Patricia  MRN: [id number]  Admission Date: 5/3/2022  Hospital Length of Stay: 2 days  Discharge Date and Time:  05/05/2022 9:26 AM  Attending Physician: Catalino Butts MD   Discharging Provider: Catalino Butts MD  Primary Care Provider: Primary Doctor No        DC DIAGNOSES :  Dig toxicity  intractible nausea  Abdominal discomfort  htn  hld  afib       Hospital Course:   57 y/o male with history of htn hld anxiety afib cad presented to the ed with complaints of worseniing abd pain and nausea with weakness and further work up suggestive of dig toxicity and further admitted for close monitoring and further work up   Nausea better  Work up negative  Us and hida negative  Tolerating po  Cards cleared  In nsr  Ok to hold dig  Dc home with close cards and pcp follow up    Consults:   Consults (From admission, onward)        Status Ordering Provider     Inpatient consult to Cardiology  Once        Provider:  Mohan Coleman MD    Completed CATALINO BUTTS          Final Active Diagnoses:      Problems Resolved During this Admission:    Diagnosis Date Noted Date Resolved POA    PRINCIPAL PROBLEM:  Right upper quadrant abdominal pain [R10.11] 05/04/2022 05/05/2022 Yes      Discharged Condition: good    Disposition: Home or Self Care    Follow Up:   Follow-up Information     Amadeo Schulz MD In 1 week.    Specialty: Cardiothoracic Surgery  Why: As needed  Contact information:  [address] [facility] [id number] [phone]             Buzz Samuel MD Follow up in 1 week(s).    Specialty: Internal Medicine  Contact information:  [address] [address] [phone]                       Patient Instructions:      Diet Adult Regular     Activity as tolerated     Medications:  Reconciled Home Medications:      Medication List      START  taking these medications    dicyclomine 20 mg tablet  Commonly known as: BENTYL  Take 1 tablet (20 mg total) by mouth every 6 (six) hours.     promethazine 25 MG tablet  Commonly known as: PHENERGAN  Take 1 tablet (25 mg total) by mouth every 6 (six) hours as needed for Nausea.        CONTINUE taking these medications    aspirin 162.5 mg Cp24  aspirin     atorvastatin 20 MG tablet  Commonly known as: LIPITOR  atorvastatin 20 mg tablet   TAKE 1 TABLET BY MOUTH EVERY DAY     lisinopriL-hydrochlorothiazide 20-12.5 mg per tablet  Commonly known as: PRINZIDE,ZESTORETIC  Take 1 tablet by mouth once daily.     ranolazine 1,000 mg Tb12  Commonly known as: RANEXA  Take 1,000 mg by mouth 2 (two) times daily.        STOP taking these medications    digoxin 250 mcg tablet  Commonly known as: LANOXIN            Significant Diagnostic Studies: Labs:   BMP:   Recent Labs   Lab 05/03/22  0951   CO2 28   BUN 11.7   CREATININE 1.19*   CALCIUM 9.5       Pending Diagnostic Studies:     Procedure Component Value Units Date/Time    Comprehensive metabolic panel [[id number]] Collected: 05/04/22 0237    Order Status: Sent Lab Status: No result     Specimen: Blood         Indwelling Lines/Drains at time of discharge:   Lines/Drains/Airways     None                 Time spent on the discharge of patient: 35 minutes         Catalino Love MD  Department of Hospital Medicine  [facility] Telemetry

## 2022-05-05 NOTE — PROGRESS NOTES
The patient is seen and examined independently. Agree with the note of GERALDINE Scott, which Epic will not allow me to cosign. Pt with abdominal pain and elevated digoxin level. No indication for digibind. Will hold digoxin for now, may resume on outpatient follow up.

## 2022-05-05 NOTE — PLAN OF CARE
05/05/22 1219   Final Note   Assessment Type Final Discharge Note   Anticipated Discharge Disposition Home

## 2022-05-05 NOTE — PROGRESS NOTES
Cardiology Daily Progress Note    Patient Name: Sudheer Patricia  Age: 56 y.o.  : 1965  MRN: [id number]  Admission Date: 5/3/2022      Subjective: No acute cardiac events overnight. Patient remains in NSR/SB on telemetry monitoring. He notes only mild abdominal pain. No chest discomfort or shortness of breath. He denies any current headache or visual changes. No auras.       Review of Systems - General ROS: negative.  Respiratory ROS: no cough, shortness of breath, or wheezing.  Cardiovascular ROS: no chest pain or dyspnea on exertion.  Gastrointestinal ROS: positive for abdominal pain, no change in bowel habits, no black or bloody stools.  Genito-Urinary ROS: no dysuria, trouble voiding, or hematuria.  Musculoskeletal ROS: negative.  Neurological ROS: negative.      Health Status:  Review of patient's allergies indicates:  No Known Allergies    Current Facility-Administered Medications   Medication Dose Route Frequency Provider Last Rate Last Admin    acetaminophen tablet 650 mg  650 mg Oral Q8H PRN Catalino Love MD        aspirin chewable tablet 81 mg  81 mg Oral Daily Catalino Love MD   81 mg at 22 1201    atorvastatin tablet 40 mg  40 mg Oral QHS Catalino Love MD   40 mg at 22 204    HYDROcodone-acetaminophen 5-325 mg per tablet 1 tablet  1 tablet Oral Q4H PRN Catalino Love MD        melatonin tablet 6 mg  6 mg Oral Nightly PRN Catalino Love MD        ondansetron injection 4 mg  4 mg Intravenous Q8H PRN Catalino Love MD        promethazine tablet 25 mg  25 mg Oral Q6H PRN Catalino Love MD        ranolazine 12 hr tablet 1,000 mg  1,000 mg Oral BID Catalino Love MD   1,000 mg at 22    sodium chloride 0.9% flush 10 mL  10 mL Intravenous PRN Catalino Love MD           Objective:  Patient Vitals for the past 24 hrs:   BP Temp Temp src Pulse Resp SpO2   22 0336 124/77 97.6 °F (36.4 °C) -- (!) 54 18 96 %   22 2578  125/71 97.7 °F (36.5 °C) -- (!) 54 18 98 %   05/04/22 1525 128/83 97.8 °F (36.6 °C) Oral 75 20 96 %   05/04/22 1130 128/83 98.1 °F (36.7 °C) Oral 64 18 100 %     No results found for this or any previous visit (from the past 24 hour(s)).  [unfilled]  Wt Readings from Last 3 Encounters:   05/03/22 122.2 kg (269 lb 6.4 oz)   07/06/20 107.9 kg (237 lb 14 oz)       Physical Exam:  General: Alert and oriented, no acute distress.  Neck: No carotid bruit, no jugular venous distention.  Respiratory: Breath sounds are equal, symmetrical chest wall expansion. Breath sounds are clear.  Cardiovascular: Normal rate, regular rhythm. No murmur. No gallop. No edema noted. Patient is NSR on tele.  Integumentary: Clean, warm, dry, and intact.  Neurologic: Alert and oriented.   Psychiatric: Cooperative, appropriate mood and affect.        Assessment/Plan:    1. Digoxin toxicity  -digoxin level 4.6  -continue holding Digoxin  -patient to follow up with Dr. Coleman in the outpatient clinic to discuss either resumption of Digoxin vs alternate medical therapy    2. PAF; h/o mild bradycardia  -patient is currently NSR/SB  -continue beta blocker and aspirin  -continue telemetry monitoring    3. HTN  -continue to monitor and adjust medical therapy as indicated    4. HLD  -continue statin    5. Abdominal pain  -?r/t digoxin level elevation  -abdominal ultrasound was negative  -HIDA scan was negative  -labwork unrevealing      *Patient of Dr. Coleman. He is cleared from cardiology standpoint to be discharged home. To remain off of Digoxin until follow up with primary cardiologist.  He will need to closely monitor heart rate and rhythm at home and bring log to follow up visit with Dr. Colmean.  Needs follow up within 7-10 days.         JIMMIE Ceballos, FNP-C  [facility]

## 2022-05-05 NOTE — CARE UPDATE
This is a 56-year-old male who was admitted on 5/3/2022 with abdominal pain and nausea.  Previous history included atrial fibrillation on digoxin, hypertension.  The patient had a digoxin level of 4.6.  The patient was admitted, was placed on telemetry.  Cardiology were consulted.  Ultrasound was negative.  They recommended no Digibind.  Essentially, the patient was admitted for observation.  The patient remained hemodynamically stable, afebrile.  There was no arrhythmia of immediate concern, hypoxia, hypercapnia, acute coronary syndrome.  The patient was appropriate for an observation setting    MD FELY  , Physician Advisor

## 2022-09-21 ENCOUNTER — HISTORICAL (OUTPATIENT)
Dept: ADMINISTRATIVE | Facility: HOSPITAL | Age: 57
End: 2022-09-21
Payer: COMMERCIAL

## 2024-03-18 ENCOUNTER — OFFICE VISIT (OUTPATIENT)
Dept: URGENT CARE | Facility: CLINIC | Age: 59
End: 2024-03-18
Payer: COMMERCIAL

## 2024-03-18 VITALS
BODY MASS INDEX: 31.83 KG/M2 | HEART RATE: 65 BPM | TEMPERATURE: 99 F | WEIGHT: 235 LBS | HEIGHT: 72 IN | RESPIRATION RATE: 16 BRPM | OXYGEN SATURATION: 97 % | DIASTOLIC BLOOD PRESSURE: 83 MMHG | SYSTOLIC BLOOD PRESSURE: 129 MMHG

## 2024-03-18 DIAGNOSIS — M79.671 RIGHT FOOT PAIN: ICD-10-CM

## 2024-03-18 DIAGNOSIS — M25.571 ACUTE RIGHT ANKLE PAIN: ICD-10-CM

## 2024-03-18 DIAGNOSIS — S92.901A CLOSED FRACTURE OF RIGHT FOOT, INITIAL ENCOUNTER: Primary | ICD-10-CM

## 2024-03-18 PROCEDURE — 99214 OFFICE O/P EST MOD 30 MIN: CPT | Mod: ,,, | Performed by: FAMILY MEDICINE

## 2024-03-18 RX ORDER — DIGOXIN 250 MCG
TABLET ORAL
COMMUNITY

## 2024-03-18 NOTE — PATIENT INSTRUCTIONS
X ray per my review concern for fractures of the shaft of the 4th and 5th toes.  No other fractures of the foot or ankle per my review. We will also call with final report.     Tylenol or Motrin for discomfort, wear walking boot and avoid any additional walking that is not required.  Referral to Ortho.  Pt preference of Dr. Freire if he is doing foot evaluations.

## 2024-03-18 NOTE — PROGRESS NOTES
Subjective:      Patient ID: Sudheer Patricia is a 58 y.o. male.    Vitals:  height is 6' (1.829 m) and weight is 106.6 kg (235 lb). His temperature is 98.5 °F (36.9 °C). His blood pressure is 129/83 and his pulse is 65. His respiration is 16 and oxygen saturation is 97%.     Chief Complaint: Foot Injury (Right foot pain . Jogging and rolled ankle, felt popped. Happened yesterday. Tried taking motrin. )    1 day ago was jogging and inverted his ankle now with pain and swelling. No calf pain or swelling.          Constitution: Negative for chills, sweating and fatigue.   Cardiovascular:  Negative for chest pain.   Gastrointestinal:  Negative for abdominal pain.   Musculoskeletal:  Positive for pain, trauma, joint swelling and pain with walking.   Neurological:  Negative for dizziness.      Objective:     Physical Exam   HENT:   Mouth/Throat: Mucous membranes are moist.   Cardiovascular: Normal rate.   Pulmonary/Chest: Effort normal.   Abdominal: Normal appearance.   Musculoskeletal:      Comments: R medial ankle TTP.  R lateral foot TTP, no ankle mortise laxity, flat foot.    Neurological: no focal deficit. He is alert.   Nursing note and vitals reviewed.      Assessment:     1. Closed fracture of right foot, initial encounter    2. Right foot pain    3. Acute right ankle pain        Plan:       Closed fracture of right foot, initial encounter  -     NON-PNEUMATIC WALKING BOOT FOR HOME USE  -     Ambulatory referral/consult to Orthopedics    Right foot pain  -     XR FOOT COMPLETE 3 VIEW RIGHT; Future; Expected date: 03/18/2024    Acute right ankle pain  -     XR ANKLE COMPLETE 3 VIEW RIGHT; Future; Expected date: 03/18/2024

## 2024-05-03 ENCOUNTER — OFFICE VISIT (OUTPATIENT)
Dept: URGENT CARE | Facility: CLINIC | Age: 59
End: 2024-05-03
Payer: COMMERCIAL

## 2024-05-03 VITALS
SYSTOLIC BLOOD PRESSURE: 130 MMHG | OXYGEN SATURATION: 99 % | HEIGHT: 72 IN | DIASTOLIC BLOOD PRESSURE: 86 MMHG | BODY MASS INDEX: 31.83 KG/M2 | RESPIRATION RATE: 15 BRPM | HEART RATE: 78 BPM | WEIGHT: 235 LBS | TEMPERATURE: 98 F

## 2024-05-03 DIAGNOSIS — H60.90 OTITIS EXTERNA, UNSPECIFIED CHRONICITY, UNSPECIFIED LATERALITY, UNSPECIFIED TYPE: Primary | ICD-10-CM

## 2024-05-03 PROCEDURE — 99213 OFFICE O/P EST LOW 20 MIN: CPT | Mod: ,,, | Performed by: FAMILY MEDICINE

## 2024-05-03 RX ORDER — CIPROFLOXACIN AND DEXAMETHASONE 3; 1 MG/ML; MG/ML
4 SUSPENSION/ DROPS AURICULAR (OTIC) 2 TIMES DAILY
Qty: 7.5 ML | Refills: 0 | Status: SHIPPED | OUTPATIENT
Start: 2024-05-03 | End: 2024-05-10

## 2024-05-03 NOTE — PATIENT INSTRUCTIONS
Plan:   Medications sent to pharmacy  As discussed have your partner help you placing the ear drops onto the end of the wick so that it may travel into the ear canal.  The wick should fall out on its own once the swelling begins to resolve.  This can take 2-3 days.  You continue to use the ear drops for a full 7 days.  No swimming.  If you take a bath do not submerge your head under water.  Tylenol or Motrin as needed for pain.  Monitor for fever.  If your pain worsens or you develop fever return to clinic or seek medical attention immediately

## 2024-05-03 NOTE — PROGRESS NOTES
Subjective:      Patient ID: Sudheer Patricia is a 58 y.o. male.    Vitals:  height is 6' (1.829 m) and weight is 106.6 kg (235 lb). His temperature is 98 °F (36.7 °C). His blood pressure is 130/86 and his pulse is 78. His respiration is 15 and oxygen saturation is 99%.     Chief Complaint: Ear Problem     Patient is a 58 y.o. male who presents to urgent care with complaints of right ear pain x4  days.  States he does wear hearing protection ear buds but has not worn them in a couple of weeks.  Denies any recent Q-tip use in the ear canal although he states he does insert them into the couch once in awhile.  States there is pain radiating below the ear and in front of the ear as well.  Denies any fever        Constitution: Negative.   HENT:  Positive for ear pain.    Neck: neck negative.   Cardiovascular: Negative.    Eyes: Negative.    Respiratory: Negative.     Gastrointestinal: Negative.    Genitourinary: Negative.    Musculoskeletal: Negative.    Skin: Negative.    Allergic/Immunologic: Negative.    Neurological: Negative.    Hematologic/Lymphatic: Negative.       Objective:     Physical Exam   Constitutional: He is oriented to person, place, and time.  Non-toxic appearance. He does not appear ill. No distress.   HENT:   Head: Normocephalic and atraumatic.   Ears:   Right Ear: no impacted cerumen (there is pain elicited with tragal and pinna manipulation on the right side.  The ear canal is swollen shut and unable to view the TM.  A wick was placed.)  Eyes: Conjunctivae are normal.   Pulmonary/Chest: Effort normal.   Abdominal: Normal appearance.   Neurological: He is alert and oriented to person, place, and time.   Skin: Skin is not diaphoretic.   Psychiatric: His behavior is normal. Mood, judgment and thought content normal.   Vitals reviewed.         Previous History      Review of patient's allergies indicates:  No Known Allergies    Past Medical History:   Diagnosis Date    Atrial fibrillation      Hyperlipidemia     Hypertension      Current Outpatient Medications   Medication Instructions    aspirin 162.5 mg Cp24 aspirin    atorvastatin (LIPITOR) 20 MG tablet atorvastatin 20 mg tablet   TAKE 1 TABLET BY MOUTH EVERY DAY    ciprofloxacin-dexAMETHasone 0.3-0.1% (CIPRODEX) 0.3-0.1 % DrpS 4 drops, Right Ear, 2 times daily    digoxin (LANOXIN) 250 mcg tablet     lisinopriL-hydrochlorothiazide (PRINZIDE,ZESTORETIC) 20-12.5 mg per tablet 1 tablet, Oral, Daily    promethazine (PHENERGAN) 25 mg, Oral, Every 6 hours PRN    ranolazine (RANEXA) 1,000 mg, Oral, 2 times daily     Past Surgical History:   Procedure Laterality Date    ANGIOGRAPHY       Family History   Problem Relation Name Age of Onset    Hypertension Mother      Prostate cancer Father      Hypertension Sister      Hypertension Brother         Social History     Tobacco Use    Smoking status: Never     Passive exposure: Never    Smokeless tobacco: Never   Substance Use Topics    Alcohol use: Yes    Drug use: Never        Physical Exam      Vital Signs Reviewed   /86   Pulse 78   Temp 98 °F (36.7 °C)   Resp 15   Ht 6' (1.829 m)   Wt 106.6 kg (235 lb)   SpO2 99%   BMI 31.87 kg/m²        Procedures    Procedures     Labs     Results for orders placed or performed in visit on 09/21/22   POCT rapid strep A   Result Value Ref Range    Rapid Group A Strep Negative        Assessment:     1. Otitis externa, unspecified chronicity, unspecified laterality, unspecified type        Plan:   Medications sent to pharmacy  As discussed have your partner help you placing the ear drops onto the end of the wick so that it may travel into the ear canal.  The wick should fall out on its own once the swelling begins to resolve.  This can take 2-3 days.  You continue to use the ear drops for a full 7 days.  No swimming.  If you take a bath do not submerge your head under water.  Tylenol or Motrin as needed for pain.  Monitor for fever.  If your pain worsens or you  develop fever return to clinic or seek medical attention immediately    Otitis externa, unspecified chronicity, unspecified laterality, unspecified type    Other orders  -     ciprofloxacin-dexAMETHasone 0.3-0.1% (CIPRODEX) 0.3-0.1 % DrpS; Place 4 drops into the right ear 2 (two) times daily. for 7 days  Dispense: 7.5 mL; Refill: 0

## 2024-06-28 ENCOUNTER — OFFICE VISIT (OUTPATIENT)
Dept: URGENT CARE | Facility: CLINIC | Age: 59
End: 2024-06-28
Payer: COMMERCIAL

## 2024-06-28 VITALS
SYSTOLIC BLOOD PRESSURE: 134 MMHG | RESPIRATION RATE: 16 BRPM | BODY MASS INDEX: 33.18 KG/M2 | HEART RATE: 94 BPM | TEMPERATURE: 100 F | HEIGHT: 72 IN | OXYGEN SATURATION: 98 % | WEIGHT: 245 LBS | DIASTOLIC BLOOD PRESSURE: 89 MMHG

## 2024-06-28 DIAGNOSIS — U07.1 COVID-19 VIRUS DETECTED: ICD-10-CM

## 2024-06-28 DIAGNOSIS — R05.9 COUGH, UNSPECIFIED TYPE: Primary | ICD-10-CM

## 2024-06-28 DIAGNOSIS — U07.1 COVID-19: ICD-10-CM

## 2024-06-28 LAB
CTP QC/QA: YES
CTP QC/QA: YES
POC MOLECULAR INFLUENZA A AGN: NEGATIVE
POC MOLECULAR INFLUENZA B AGN: NEGATIVE
SARS-COV-2 RDRP RESP QL NAA+PROBE: POSITIVE

## 2024-06-28 RX ORDER — NIRMATRELVIR AND RITONAVIR 300-100 MG
KIT ORAL
Qty: 30 TABLET | Refills: 0 | Status: SHIPPED | OUTPATIENT
Start: 2024-06-28

## 2024-06-28 RX ORDER — FAMOTIDINE 20 MG/1
1 TABLET, FILM COATED ORAL 2 TIMES DAILY
COMMUNITY
Start: 2024-03-25

## 2024-06-28 RX ORDER — AZELASTINE 1 MG/ML
1 SPRAY, METERED NASAL 2 TIMES DAILY
Qty: 30 ML | Refills: 0 | Status: SHIPPED | OUTPATIENT
Start: 2024-06-28 | End: 2025-06-28

## 2024-06-28 NOTE — PATIENT INSTRUCTIONS
COVID: POSITIVE  FLU:NEGATIVE   COVID Positive  Start multi vitamin daily. Current CDC guidelines recommend that you stay home until you are fever free for at least 24  hours without the use of fever reducing medications.  Treat your symptoms as you would the common cold with over the counter medications. If you live with anybody, isolate yourself in a separate bedroom and use a separate bathroom. If your symptoms worsen or you develop shortness of breath or a fever over 103, seek medical attention immediately or go to ER.      COVID CDC quarantine recommendations discussed.  Indications for Paxlovid discussed; indications here include Atrial fibrillaton and Hypertension, per Underlying Medical Conditions Associated with Higher Risk for Severe COVID-19: Information for Healthcare Professionals  [facility]. Risks and benefits of Paxlovid discussed. Risks accepted, pt would like to try Paxlovid.      Continue OTC tylenol and motrin as needed for fever and pain x4-6 hours.  Drink plenty of fluids and get some rest.

## 2024-06-28 NOTE — PROGRESS NOTES
Subjective:      Patient ID: Sudheer Patricia is a 58 y.o. male.    Vitals:  vitals were not taken for this visit.     Chief Complaint: Sinus Problem    Patient is a 58 y.o. male who presents to urgent care with complaints of PND, dry cough, body aches, headaches x this morning. Alleviating factors include none. Patient denies fever or any other symptoms.     Sinus Problem  ROS   Objective:     Physical Exam    Assessment:     No diagnosis found.    Plan:       There are no diagnoses linked to this encounter.

## 2024-06-28 NOTE — PROGRESS NOTES
Subjective:      Patient ID: Sudheer Patricia is a 58 y.o. male.    Vitals:  height is 6' (1.829 m) and weight is 111.1 kg (245 lb). His temperature is 100.2 °F (37.9 °C). His blood pressure is 134/89 and his pulse is 94. His respiration is 16 and oxygen saturation is 98%.     Chief Complaint: Sinus Problem    Patient is a 58 y.o. male who presents to urgent care with complaints of PND, dry cough,fever  body aches, headaches x this morning. Alleviating factors include none.   Patient denies sob, paris and chest pain. Patient denies any sick contacts. Patient states this morning all of a sudden started feeling the chills, sore throat, coughing  and body aches that he rates 8/10. Patient cannot talk or take deep breaths without coughing. Increased Heart rate.     Sinus Problem  Associated symptoms include chills, coughing (cough upon deep breathing), diaphoresis, sinus pressure and a sore throat. Pertinent negatives include no congestion, neck pain or shortness of breath.       Constitution: Positive for chills, sweating, fatigue, fever and generalized weakness.   HENT:  Positive for sinus pain, sinus pressure, sore throat and trouble swallowing. Negative for ear discharge, drooling, facial swelling and congestion.    Neck: Negative for neck pain and neck stiffness.   Cardiovascular: Negative.  Negative for chest pain, palpitations and sob on exertion.   Eyes: Negative.    Respiratory:  Positive for chest tightness, cough (cough upon deep breathing), stridor and wheezing. Negative for bloody sputum and shortness of breath.    Gastrointestinal:  Negative for abdominal pain, vomiting and diarrhea.   Skin:  Negative for rash.      Objective:     Physical Exam   Constitutional: He is oriented to person, place, and time. He appears ill.   HENT:   Head: Normocephalic and atraumatic.   Ears:   Right Ear: Tympanic membrane, external ear and ear canal normal.   Left Ear: Tympanic membrane, external ear and ear canal normal.    Nose: Rhinorrhea and congestion present.   Mouth/Throat: Mucous membranes are moist. Posterior oropharyngeal erythema present.   Eyes: Pupils are equal, round, and reactive to light. Extraocular movement intact   Cardiovascular: Regular rhythm, normal heart sounds and normal pulses. Tachycardia present.   Pulmonary/Chest: Stridor (KATE. lungs in upper and lower lung fields.) present. He has wheezes (KATE. lungs in upper and lower lung fields.). He has rhonchi (KATE. lungs in upper and lower lung fields.). He has rales (KATE. lungs in upper and lower lung fields.).   Abdominal: Normal appearance.   Neurological: no focal deficit. He is alert and oriented to person, place, and time.   Psychiatric: His behavior is normal. Mood normal.   Nursing note and vitals reviewed.      Assessment:     1. Cough, unspecified type    2. COVID-19        Plan:   COVID: POSITIVE   FLU: NEGATIVE  MEDICATIONS SENT TO PHARMACY       COVID Positive  Start multi vitamin daily. Current CDC guidelines recommend that you stay home until you are fever free for at least 24  hours without the use of fever reducing medications.  Treat your symptoms as you would the common cold with over the counter medications. If you live with anybody, isolate yourself in a separate bedroom and use a separate bathroom. If your symptoms worsen or you develop shortness of breath or a fever over 103, seek medical attention immediately.     COVID CDC quarantine recommendations discussed.  Indications for Paxlovid discussed; indications here include Atrial Fibrillation and hypertension per Underlying Medical Conditions Associated with Higher Risk for Severe COVID-19: Information for Healthcare Professionals  [facility]. Risks and benefits of Paxlovid discussed. Risks accepted, pt would like to try Paxlovid.      Cough, unspecified type  -     POCT COVID-19 Rapid Screening  -     POCT Influenza A/B MOLECULAR    COVID-19    Other orders  -     nirmatrelvir-ritonavir (PAXLOVID)  300 mg (150 mg x 2)-100 mg copackaged tablets (EUA); Take 3 tablets by mouth 2 (two) times daily. Each dose contains 2 nirmatrelvir (pink tablets) and 1 ritonavir (white tablet). Take all 3 tablets together  Dispense: 30 tablet; Refill: 0  -     azelastine (ASTELIN) 137 mcg (0.1 %) nasal spray; 1 spray (137 mcg total) by Nasal route 2 (two) times daily.  Dispense: 30 mL; Refill: 0

## 2024-07-01 ENCOUNTER — NURSE TRIAGE (OUTPATIENT)
Dept: ADMINISTRATIVE | Facility: CLINIC | Age: 59
End: 2024-07-01
Payer: COMMERCIAL

## 2024-07-01 NOTE — TELEPHONE ENCOUNTER
Pt responded to covid home symptom monitoring system. Pt has continued with cough and sore throat. Pt has not started Paxlovid or any cough syrup. Dispo-home care. Care advice to treat at home provided. Instructed patient to call back with additional questions or worsening of symptoms. Pt verbalized understanding.   Reason for Disposition   [1] Persisting symptoms of COVID-19 AND [2] symptoms BETTER (improving)    Additional Information   Negative: SEVERE difficulty breathing (e.g., struggling for each breath, speaks in single words)   Negative: [1] SEVERE weakness (e.g., can't stand or can barely walk) AND [2] new-onset or WORSE   Negative: Difficult to awaken or acting confused (e.g., disoriented, slurred speech)   Negative: Bluish (or gray) lips or face now   Negative: Sounds like a life-threatening emergency to the triager   Negative: [1] MODERATE difficulty breathing (e.g., speaks in phrases, SOB even at rest, pulse 100-120) AND [2] new-onset or WORSE   Negative: [1] MODERATE difficulty breathing AND [2] oxygen level (e.g., pulse oximetry) 91 to 94%   Negative: Oxygen level (e.g., pulse oximetry) 90% or lower   Negative: MODERATE difficulty breathing (e.g., speaks in phrases, SOB even at rest, pulse 100-120)   Negative: [1] Drinking very little AND [2] dehydration suspected (e.g., no urine > 12 hours, very dry mouth, very lightheaded)   Negative: Patient sounds very sick or weak to the triager   Negative: [1] MILD difficulty breathing (e.g., minimal/no SOB at rest, SOB with walking, pulse <100) AND [2] new-onset   Negative: [1] Persisting symptoms of COVID-19 AND [2] NEW symptom AND [3] could be serious   Negative: Oxygen level (e.g., pulse oximetry) 91 to 94%   Negative: [1] Caller has URGENT question AND [2] triager unable to answer question   Negative: [1] Persisting symptoms of COVID-19 AND [2] symptoms WORSE   Negative: [1] Caller has NON-URGENT question AND [2] triager unable to answer   Negative: [1]  Persisting symptoms of COVID-19 AND [2] NO medical visit for COVID-19 in past 2 weeks   Negative: [1] Persisting symptoms of COVID-19 AND [2] symptoms SAME AND [3] medical visit for COVID-19 in past 2 weeks    Protocols used: COVID-19 - Persisting Symptoms Follow-up Call-A-AH

## 2024-12-13 ENCOUNTER — HOSPITAL ENCOUNTER (EMERGENCY)
Facility: HOSPITAL | Age: 59
Discharge: HOME | End: 2024-12-13
Attending: STUDENT IN AN ORGANIZED HEALTH CARE EDUCATION/TRAINING PROGRAM
Payer: COMMERCIAL

## 2024-12-13 VITALS
TEMPERATURE: 97 F | DIASTOLIC BLOOD PRESSURE: 85 MMHG | OXYGEN SATURATION: 98 % | WEIGHT: 250 LBS | RESPIRATION RATE: 17 BRPM | HEART RATE: 63 BPM | BODY MASS INDEX: 33.91 KG/M2 | SYSTOLIC BLOOD PRESSURE: 153 MMHG

## 2024-12-13 DIAGNOSIS — M54.10 RADICULOPATHY, UNSPECIFIED SPINAL REGION: ICD-10-CM

## 2024-12-13 DIAGNOSIS — M54.2 NECK PAIN: ICD-10-CM

## 2024-12-13 DIAGNOSIS — S16.1XXA STRAIN OF NECK MUSCLE, INITIAL ENCOUNTER: Primary | ICD-10-CM

## 2024-12-13 DIAGNOSIS — R42 DIZZINESS: ICD-10-CM

## 2024-12-13 LAB
ALBUMIN SERPL-MCNC: 4.4 G/DL (ref 3.5–5)
ALBUMIN/GLOB SERPL: 1.3 RATIO (ref 1.1–2)
ALP SERPL-CCNC: 41 UNIT/L (ref 40–150)
ALT SERPL-CCNC: 33 UNIT/L (ref 0–55)
ANION GAP SERPL CALC-SCNC: 9 MEQ/L
APTT PPP: 28.2 SECONDS (ref 23.2–33.7)
AST SERPL-CCNC: 25 UNIT/L (ref 5–34)
BASOPHILS # BLD AUTO: 0.03 X10(3)/MCL
BASOPHILS NFR BLD AUTO: 0.4 %
BILIRUB SERPL-MCNC: 0.6 MG/DL
BUN SERPL-MCNC: 20.6 MG/DL (ref 8.4–25.7)
CALCIUM SERPL-MCNC: 9.4 MG/DL (ref 8.4–10.2)
CHLORIDE SERPL-SCNC: 108 MMOL/L (ref 98–107)
CO2 SERPL-SCNC: 24 MMOL/L (ref 22–29)
CREAT SERPL-MCNC: 1.34 MG/DL (ref 0.72–1.25)
CREAT/UREA NIT SERPL: 15
DIGOXIN SERPL-MCNC: 0.3 NG/ML (ref 0.8–2)
EOSINOPHIL # BLD AUTO: 0.18 X10(3)/MCL (ref 0–0.9)
EOSINOPHIL NFR BLD AUTO: 2.7 %
ERYTHROCYTE [DISTWIDTH] IN BLOOD BY AUTOMATED COUNT: 13.1 % (ref 11.5–17)
GFR SERPLBLD CREATININE-BSD FMLA CKD-EPI: >60 ML/MIN/1.73/M2
GLOBULIN SER-MCNC: 3.5 GM/DL (ref 2.4–3.5)
GLUCOSE SERPL-MCNC: 97 MG/DL (ref 74–100)
HCT VFR BLD AUTO: 44 % (ref 42–52)
HGB BLD-MCNC: 14.3 G/DL (ref 14–18)
IMM GRANULOCYTES # BLD AUTO: 0.11 X10(3)/MCL (ref 0–0.04)
IMM GRANULOCYTES NFR BLD AUTO: 1.6 %
INR PPP: 1
LYMPHOCYTES # BLD AUTO: 2.21 X10(3)/MCL (ref 0.6–4.6)
LYMPHOCYTES NFR BLD AUTO: 33 %
MCH RBC QN AUTO: 31.4 PG (ref 27–31)
MCHC RBC AUTO-ENTMCNC: 32.5 G/DL (ref 33–36)
MCV RBC AUTO: 96.7 FL (ref 80–94)
MONOCYTES # BLD AUTO: 0.54 X10(3)/MCL (ref 0.1–1.3)
MONOCYTES NFR BLD AUTO: 8.1 %
NEUTROPHILS # BLD AUTO: 3.63 X10(3)/MCL (ref 2.1–9.2)
NEUTROPHILS NFR BLD AUTO: 54.2 %
NRBC BLD AUTO-RTO: 0 %
PLATELET # BLD AUTO: 230 X10(3)/MCL (ref 130–400)
PMV BLD AUTO: 10.6 FL (ref 7.4–10.4)
POTASSIUM SERPL-SCNC: 3.9 MMOL/L (ref 3.5–5.1)
PROT SERPL-MCNC: 7.9 GM/DL (ref 6.4–8.3)
PROTHROMBIN TIME: 12.5 SECONDS (ref 12.5–14.5)
RBC # BLD AUTO: 4.55 X10(6)/MCL (ref 4.7–6.1)
SODIUM SERPL-SCNC: 141 MMOL/L (ref 136–145)
WBC # BLD AUTO: 6.7 X10(3)/MCL (ref 4.5–11.5)

## 2024-12-13 PROCEDURE — 96374 THER/PROPH/DIAG INJ IV PUSH: CPT

## 2024-12-13 PROCEDURE — 80053 COMPREHEN METABOLIC PANEL: CPT | Performed by: NURSE PRACTITIONER

## 2024-12-13 PROCEDURE — 85610 PROTHROMBIN TIME: CPT | Performed by: NURSE PRACTITIONER

## 2024-12-13 PROCEDURE — 80162 ASSAY OF DIGOXIN TOTAL: CPT | Performed by: NURSE PRACTITIONER

## 2024-12-13 PROCEDURE — 93010 ELECTROCARDIOGRAM REPORT: CPT | Mod: ,,, | Performed by: INTERNAL MEDICINE

## 2024-12-13 PROCEDURE — 85730 THROMBOPLASTIN TIME PARTIAL: CPT | Performed by: NURSE PRACTITIONER

## 2024-12-13 PROCEDURE — 85025 COMPLETE CBC W/AUTO DIFF WBC: CPT | Performed by: NURSE PRACTITIONER

## 2024-12-13 PROCEDURE — 93005 ELECTROCARDIOGRAM TRACING: CPT

## 2024-12-13 PROCEDURE — 25000003 PHARM REV CODE 250: Performed by: STUDENT IN AN ORGANIZED HEALTH CARE EDUCATION/TRAINING PROGRAM

## 2024-12-13 PROCEDURE — 99285 EMERGENCY DEPT VISIT HI MDM: CPT | Mod: 25

## 2024-12-13 PROCEDURE — 63600175 PHARM REV CODE 636 W HCPCS: Performed by: STUDENT IN AN ORGANIZED HEALTH CARE EDUCATION/TRAINING PROGRAM

## 2024-12-13 RX ORDER — PREDNISONE 20 MG/1
20 TABLET ORAL DAILY
Qty: 5 TABLET | Refills: 0 | Status: SHIPPED | OUTPATIENT
Start: 2024-12-13 | End: 2024-12-18

## 2024-12-13 RX ORDER — KETOROLAC TROMETHAMINE 30 MG/ML
15 INJECTION, SOLUTION INTRAMUSCULAR; INTRAVENOUS
Status: COMPLETED | OUTPATIENT
Start: 2024-12-13 | End: 2024-12-13

## 2024-12-13 RX ORDER — METHOCARBAMOL 500 MG/1
1000 TABLET, FILM COATED ORAL 3 TIMES DAILY
Qty: 30 TABLET | Refills: 0 | Status: SHIPPED | OUTPATIENT
Start: 2024-12-13 | End: 2024-12-18

## 2024-12-13 RX ORDER — ASPIRIN 325 MG
325 TABLET, DELAYED RELEASE (ENTERIC COATED) ORAL
Status: DISCONTINUED | OUTPATIENT
Start: 2024-12-13 | End: 2024-12-13

## 2024-12-13 RX ORDER — METHOCARBAMOL 500 MG/1
1000 TABLET, FILM COATED ORAL
Status: COMPLETED | OUTPATIENT
Start: 2024-12-13 | End: 2024-12-13

## 2024-12-13 RX ORDER — KETOROLAC TROMETHAMINE 30 MG/ML
30 INJECTION, SOLUTION INTRAMUSCULAR; INTRAVENOUS
Status: DISCONTINUED | OUTPATIENT
Start: 2024-12-13 | End: 2024-12-13

## 2024-12-13 RX ORDER — LISINOPRIL 10 MG/1
20 TABLET ORAL
Status: DISCONTINUED | OUTPATIENT
Start: 2024-12-13 | End: 2024-12-13

## 2024-12-13 RX ORDER — ORPHENADRINE CITRATE 30 MG/ML
60 INJECTION INTRAMUSCULAR; INTRAVENOUS
Status: DISCONTINUED | OUTPATIENT
Start: 2024-12-13 | End: 2024-12-13

## 2024-12-13 RX ORDER — HYDROCHLOROTHIAZIDE 12.5 MG/1
12.5 TABLET ORAL
Status: DISCONTINUED | OUTPATIENT
Start: 2024-12-13 | End: 2024-12-13

## 2024-12-13 RX ORDER — MELOXICAM 7.5 MG/1
7.5 TABLET ORAL DAILY
Qty: 10 TABLET | Refills: 0 | Status: SHIPPED | OUTPATIENT
Start: 2024-12-13 | End: 2024-12-23

## 2024-12-13 RX ADMIN — KETOROLAC TROMETHAMINE 15 MG: 30 INJECTION, SOLUTION INTRAMUSCULAR at 03:12

## 2024-12-13 RX ADMIN — METHOCARBAMOL 1000 MG: 500 TABLET ORAL at 03:12

## 2024-12-13 NOTE — FIRST PROVIDER EVALUATION
"Medical screening examination initiated.  I have conducted a focused provider triage encounter, findings are as follows:  Chief Complaint   Patient presents with    Hand Pain    Neck Pain     C/o bilateral hand pain and trouble with his "". States worse in R hand x 1 week. Also reports neck pain x 1 week. Denies numbness/tingling. GCS 15.          Brief history of present illness:  58 y/o male who presents with bilateral hand issues with  with right hand worse than left. Also c/o neck pain for about a week. He also feels "drunk" or not himself for about 1.5-2 weeks. Just got back from cruise so thought maybe related to this but symptoms present prior to cruise. Hx bone spurs in neck and "cyst on brain". Does not follow with pcp. Hx htn, hypercholesterolemia, afib    There were no vitals filed for this visit.    Pertinent physical exam:  alert, nonlabored, ambulatory     Brief workup plan:  imaging, labs    Preliminary workup initiated; this workup will be continued and followed by the physician or advanced practice provider that is assigned to the patient when roomed.  "

## 2024-12-13 NOTE — DISCHARGE INSTRUCTIONS
Follow-up with the primary care physician.    Follow-up with spine surgery.  Please see contact information for Dr. Rivas.     Follow-up with Neurology.  Please see list of attached neurologist.      Return to the emergency department if you have any worsening pain, weakness, numbness, fever, difficulty breathing, or any other concerns.

## 2024-12-13 NOTE — ED PROVIDER NOTES
"Encounter Date: 12/13/2024    SCRIBE #1 NOTE: I, Tucker Vyas, am scribing for, and in the presence of,  Richie Garcia MD. I have scribed the entire note.       History     Chief Complaint   Patient presents with    Hand Pain    Neck Pain     C/o intermittent bilateral hand pain and trouble with his "". States worse in R hand x 1 week. Also reports neck pain x 1 week. Denies numbness/tingling. GCS 15. Hx afib, HTN, brain cysts.      Patient is a 59 year old male with a hx of A fib and HTN presents to the ED for neck pain starting 3 months ago. Pt states he chronically experiences neck pain. Pt notes he is also experiencing  weakness in his bilateral hands. Pt states his  is worse in his right hand than his left. Pt lastly notes he has been feeling slightly off balanced while walking.     The history is provided by the patient. No  was used.     Review of patient's allergies indicates:  No Known Allergies  Past Medical History:   Diagnosis Date    Atrial fibrillation     Hyperlipidemia     Hypertension      Past Surgical History:   Procedure Laterality Date    ANGIOGRAPHY       Family History   Problem Relation Name Age of Onset    Hypertension Mother      Prostate cancer Father      Hypertension Sister      Hypertension Brother       Social History     Tobacco Use    Smoking status: Never     Passive exposure: Never    Smokeless tobacco: Never   Substance Use Topics    Alcohol use: Yes     Comment: occasional    Drug use: Never     Review of Systems   Constitutional:  Negative for fever.   HENT:  Negative for sore throat.    Eyes:  Negative for visual disturbance.   Respiratory:  Negative for shortness of breath.    Cardiovascular:  Negative for chest pain.   Gastrointestinal:  Negative for abdominal pain.   Genitourinary:  Negative for dysuria.   Musculoskeletal:  Positive for neck pain. Negative for joint swelling.   Skin:  Negative for rash.   Neurological:  Negative for weakness. "         strength weakened in bilateral hands. Worse on right hand    Psychiatric/Behavioral:  Negative for confusion.    All other systems reviewed and are negative.      Physical Exam     Initial Vitals [12/13/24 0919]   BP Pulse Resp Temp SpO2   (!) 168/117 (!) 54 18 97.3 °F (36.3 °C) 99 %      MAP       --         Physical Exam    Constitutional: He appears well-developed and well-nourished.   HENT:   Head: Normocephalic and atraumatic. Mouth/Throat: Oropharynx is clear and moist.   Eyes: Pupils are equal, round, and reactive to light.   Neck: Neck supple. No thyromegaly present. No tracheal deviation present.   Normal range of motion.  Cardiovascular:  Normal rate, regular rhythm, normal heart sounds and intact distal pulses.           Pulmonary/Chest: Breath sounds normal. No stridor. No respiratory distress. He has no wheezes. He exhibits no tenderness.   Abdominal: Abdomen is soft. Bowel sounds are normal. There is no abdominal tenderness. There is no rebound and no guarding.   Musculoskeletal:         General: No tenderness or edema. Normal range of motion.      Cervical back: Normal range of motion and neck supple.      Comments:  strength appears to be intact.  5/5 upper and lower extremity strength.  Cranial nerves 2-12 grossly intact.  Able to stand and ambulate with a normal gait.     Neurological: He is alert and oriented to person, place, and time. He has normal strength. No sensory deficit. GCS score is 15. GCS eye subscore is 4. GCS verbal subscore is 5. GCS motor subscore is 6.   Skin: Skin is warm and dry. Capillary refill takes less than 2 seconds.   Psychiatric: He has a normal mood and affect.         ED Course   Procedures  Labs Reviewed   CBC WITH DIFFERENTIAL - Abnormal       Result Value    WBC 6.70      RBC 4.55 (*)     Hgb 14.3      Hct 44.0      MCV 96.7 (*)     MCH 31.4 (*)     MCHC 32.5 (*)     RDW 13.1      Platelet 230      MPV 10.6 (*)     Neut % 54.2      Lymph % 33.0       Mono % 8.1      Eos % 2.7      Basophil % 0.4      Lymph # 2.21      Neut # 3.63      Mono # 0.54      Eos # 0.18      Baso # 0.03      IG# 0.11 (*)     IG% 1.6      NRBC% 0.0     COMPREHENSIVE METABOLIC PANEL - Abnormal    Sodium 141      Potassium 3.9      Chloride 108 (*)     CO2 24      Glucose 97      Blood Urea Nitrogen 20.6      Creatinine 1.34 (*)     Calcium 9.4      Protein Total 7.9      Albumin 4.4      Globulin 3.5      Albumin/Globulin Ratio 1.3      Bilirubin Total 0.6      ALP 41      ALT 33      AST 25      eGFR >60      Anion Gap 9.0      BUN/Creatinine Ratio 15     DIGOXIN LEVEL - Abnormal    Digoxin Level 0.3 (*)    PROTIME-INR - Normal    PT 12.5      INR 1.0     APTT - Normal    PTT 28.2       EKG Readings: (Independently Interpreted)   Initial Reading: No STEMI. Rhythm: Sinus Bradycardia. Heart Rate: 51. Ectopy: No Ectopy. Conduction: 1st Degree AV Block. ST Segments: Normal ST Segments. T Waves: Normal. Axis: Right Axis Deviation.   Done on 12/13/24 at 0938.      ECG Results              EKG 12-lead (Final result)        Collection Time Result Time QRS Duration OHS QTC Calculation    12/13/24 09:38:00 12/14/24 03:50:31 94 418                     Final result by Interface, Lab [facility] (12/14/24 03:50:37)                   Narrative:    Test Reason : R42,    Vent. Rate :  51 BPM     Atrial Rate :  51 BPM     P-R Int : 212 ms          QRS Dur :  94 ms      QT Int : 454 ms       P-R-T Axes :  20 131  81 degrees    QTcB Int : 418 ms    Sinus bradycardia with 1st degree A-V block  Right axis deviation  Abnormal ECG  When compared with ECG of 03-May-2022 14:47,  No significant change was found  Confirmed by Derrell Mei ([id number]) on 12/14/2024 3:50:30 AM    Referred By: AAAREFERRAL SELF           Confirmed By: Derrell Mei                                  Imaging Results              MRI Cervical Spine Without Contrast (Final result)  Result time 12/13/24 13:13:13      Final result by Devonte Cuellar  MD (12/13/24 13:13:13)                   Impression:      Cervical spine degenerative disc disease and spondylosis level by level discussed above.      Electronically signed by: Devonte Cuellar  Date:    12/13/2024  Time:    13:13               Narrative:    EXAMINATION:  MRI CERVICAL SPINE WITHOUT CONTRAST    CLINICAL HISTORY:  Myelopathy, acute, cervical spine;    TECHNIQUE:  Multiple MRI pulse sequences were performed of the cervical spine without contrast.    COMPARISON:  CT cervical spine same date.    FINDINGS:  There is loss of normal cervical lordosis and there is kyphosis which is centered at C5 and C6.  Cervical vertebrae stature is preserved.  There is no significant listhesis.  Multiple anterior degenerative hypertrophic spurrings which are more apparent at the level of C5 and C6.  Prevertebral soft tissue is without prominence.  There are several indentations upon the ventral thecal sac by disc pathology and spondylosis.  Cervical cord images are degraded by artifacts.  No definite cord edema or myelomalacia.  Disc segmental analysis is given below    At C2-C3, disc is unremarkable.  Central canal is not stenosed.  Bilateral facet arthropathy which is more pronounced on the left.  Bilateral neural foramen are patent.    At C3-C4, disc is unremarkable.  There is again bilateral facet arthropathy more pronounced on the left.  Central canal is not stenosed.  Right neural foramen is patent.  There is mild spondylotic narrowing of the left neural foramen.    At C4-C5, disc is unremarkable.  Central canal is not stenosed.  Right neural foramen is quite patent left neural foramen is encroached by uncovertebral and facet arthropathy causing marked narrowing.    At C5-C6, there is osteophyte disc complex which indents the ventral thecal sac.  Cord is not compressed.  There is bilateral uncovertebral and to a lesser degree facet arthropathy.  This results in mild narrowing of the right neural foramen.  There is  moderate to marked narrowing of the left neural foramen.    At C6-C7, there is broad osteophyte disc complex which effaces the subarachnoid space.  Cord is not compressed.  Bilateral neural foramen are encroached by uncovertebral arthropathy.  There is also left facet arthropathy.  There is moderate marked narrowing right neural foramen and marked narrowing of the left neural foramen.    At C7-T1, there is osteophyte disc complex which indents the ventral thecal sac without causing cord compression.  Right neural foramen is patent.  Left uncovertebral and facet arthropathy encroaches and results in marked narrowing of the left neural foramen.                                       MRI Brain Without Contrast (Final result)  Result time 12/13/24 13:00:05      Final result by Devonte Cuellar MD (12/13/24 13:00:05)                   Impression:      1.  No acute intracranial findings identified.    2.  Minimal chronic microangiopathic ischemia.      Electronically signed by: Devonte Cuellar  Date:    12/13/2024  Time:    13:00               Narrative:    EXAMINATION:  MRI BRAIN WITHOUT CONTRAST    CLINICAL HISTORY:  weakness;    TECHNIQUE:  Multiplanar MRI sequences were performed of the brain without contrast.    COMPARISON:  CT brain without contrast December 13, 2024.    FINDINGS:  Chronic microangiopathic ischemia minimally involves the helena and there is right frontal lobe subcortical solitary T2 FLAIR hyperintensity also representing minimal chronic ischemic change.  There are no other foci of abnormal increased or decreased signal intensity throughout the cerebral hemispheres, cerebellum, basal ganglia or brainstem. Gradient echo sequences demonstrate no evidence of de phasing artifact to suggest hemorrhagic byproducts. No evidence of diffusion restriction or ADC map signal drop out to suggest acute infarct. The sella and suprasellar areas are unremarkable.    The cerebellar tonsils are normally positioned. There is  no acute intracranial hemorrhage, hydrocephalus, midline shift or mass effect. No acute extra axial fluid collections identified. The mastoid air cells are clear.                                       CT Cervical Spine Without Contrast (Final result)  Result time 12/13/24 10:44:15      Final result by Devonte Cuellar MD (12/13/24 10:44:15)                   Impression:      Cervical spine degenerative disc disease and spondylosis level by level discussed above.      Electronically signed by: Devonte Cuellar  Date:    12/13/2024  Time:    10:44               Narrative:    EXAMINATION:  CT CERVICAL SPINE WITHOUT CONTRAST    CLINICAL HISTORY:  Cervical radiculopathy, no red flags;    TECHNIQUE:  Sequential axial images were obtained of the cervical spine without contrast and the images were reformatted.    Dose length product was 636 mGycm. Approximated exposure control was utilized to minimize radiation dose.    COMPARISON:  None available.    FINDINGS:  There is loss of normal cervical lordosis with kyphosis which is centered at C5 and C6.  Cervical vertebrae stature and alignment is preserved.  No acute fracture or malalignment identified.  Disc segmental analysis is given below:    At C2-C3, there is no disc herniation.  Bilateral facet arthropathy.  Central canal is not stenosed and there are no narrowings of the neural foramen.    At C3-C4, there is no disc herniation.  Bilateral facet arthropathy which is more pronounced on left.  Central canal is not stenosed and there are no significant narrowings of the neural foramen.    At C4-C5, disc is unremarkable.  Central canal is not stenosed.  Right neural foramen is patent.  Moderate to marked narrowing of the left neural foramen is caused by uncovertebral and facet arthropathy.    At C5-C6, there is ventral osteophyte ridging which indents the ventral thecal sac.  Cord is not compressed.  Bilateral uncovertebral and facet arthropathy results in mild narrowing of the  right neural foramen and moderate narrowing of left neural foramen.    At C6-C7, there is broad osteophyte disc complex which indents the ventral thecal sac.  Bilateral neural foramen are encroached by uncovertebral and left-sided facet arthropathy.  These findings result in bilateral moderate narrowings of the neural foramen.    At C7-T1, disc is unremarkable.  Bilateral facet arthropathy.  Central canal is not stenosed.  Right neural foramen is unremarkable.  Moderate spondylotic narrowing of the left neural foramen.                                       CT Head Without Contrast (Final result)  Result time 12/13/24 10:37:05      Final result by Devonte Cuellar MD (12/13/24 10:37:05)                   Impression:      No acute intracranial findings identified.      Electronically signed by: Devonte Cuellar  Date:    12/13/2024  Time:    10:37               Narrative:    EXAMINATION:  CT HEAD WITHOUT CONTRAST    CLINICAL HISTORY:  Transient ischemic attack (TIA);    TECHNIQUE:  Sequential axial images were performed of the brain without contrast.    Dose product length of 1150 mGycm. Automated exposure control was utilized to minimize radiation dose.    COMPARISON:  None available.    FINDINGS:  Gray-white matter differentiation is unremarkable. There is no intracranial mass effect, midline shift, hydrocephalus or hemorrhage. There is no sulcal effacement or low attenuation changes to suggest recent large vessel territory infarction.  There is no acute extra axial fluid collection. Visualized paranasal sinuses are clear without mucosal thickening, polypoidal abnormality or air-fluid levels. Mastoid air cells aeration is optimal.                                       Medications   ketorolac injection 15 mg (15 mg Intravenous Given 12/13/24 1558)   methocarbamoL tablet 1,000 mg (1,000 mg Oral Given 12/13/24 1558)     Medical Decision Making  Judging by the patient's chief complaint and pertinent history, the patient has  the following possible differential diagnoses, including but not limited to the following.  Some of these are deemed to be lower likelihood and some more likely based on my physical exam and history combined with possible lab work and/or imaging studies.   Please see the pertinent studies, and refer to the HPI.  Some of these diagnoses will take further evaluation to fully rule out, perhaps as an outpatient and the patient was encouraged to follow up when discharged for more comprehensive evaluation.    Sprain, strain, contusion, DDD, disc herniation, spinal stenosis, spondylitis, fracture, mass, spinal cord compression, transverse myelitis, CVA, ICH    Patient is a 59-year-old male presents to emergency department for complaining of some weakness in his  on the right side he also has a on the left side.  These symptoms have been ongoing for several months.  Given the patient's symptoms, CT of the head and neck obtained.  CT of the neck without acute fracture.  Degenerative changes noted.  See report.  CT of the head without any acute abnormalities.  Given the patient's presentation concern for possible nerve root impingement, low suspicion for any acute CVA however given his complaints of difficulty with ambulation will obtain MRI of the brain and neck.  MRI of the brain without any acute abnormality.  MRI of the cervical spine without any acute abnormality however some degenerative changes noted.  I discussed this with spine surgery, Neurosurgery.  Spoke with Dr. Rivas who has evaluated the films.  Discussed all results with the patient.  Discussed need for follow-up with spine surgery.  Discussed strict return precautions.  Discussed need for follow-up with Neurology.  Strict return precautions discussed.  Discussed if any worsening weakness, numbness, fever, loss of bladder or bowel incontinence, intractable pain return to emergency department for repeat evaluation.  All results discussed with the patient  and family.  On reassessment patient resting comfortably, requesting discharge.  Discussed all results and re-emphasized plan of follow-up.  Patient verbalized understanding.  He states he can best make appointments on Fridays as he does not work on Friday.  He states he is doing some lab work this morning at the hospital and decided to get evaluated for his neck pain.  He will call for follow-up appointment with both Neurology and Neurosurgery.  Answered all questions at this time.  Hemodynamically stable for continued outpatient management strict return precautions.    Problems Addressed:  Neck pain: acute illness or injury that poses a threat to life or bodily functions  Radiculopathy, unspecified spinal region: acute illness or injury that poses a threat to life or bodily functions  Strain of neck muscle, initial encounter: acute illness or injury that poses a threat to life or bodily functions    Amount and/or Complexity of Data Reviewed  External Data Reviewed: ECG and notes.  Labs: ordered.  Radiology: ordered and independent interpretation performed.  ECG/medicine tests: ordered and independent interpretation performed.  Discussion of management or test interpretation with external provider(s): Discussed with a spine surgery Neurosurgery    Risk  Prescription drug management.  Parenteral controlled substances.  Decision regarding hospitalization.            Scribe Attestation:   Scribe #1: I performed the above scribed service and the documentation accurately describes the services I performed. I attest to the accuracy of the note.    Attending Attestation:           Physician Attestation for Scribe:  Physician Attestation Statement for Scribe #1: I, Richie Garcia MD, reviewed documentation, as scribed by Tucker Vyas in my presence, and it is both accurate and complete.             ED Course as of 12/16/24 0744   Fri Dec 13, 2024   1630 Discussed with Dr. Rivas.  Can follow-up in the office. [RP]      ED  Course User Index  [RP] Richie Garcia MD                           Clinical Impression:  Final diagnoses:  [R42] Dizziness  [S16.1XXA] Strain of neck muscle, initial encounter (Primary)  [M54.2] Neck pain  [M54.10] Radiculopathy, unspecified spinal region          ED Disposition Condition    Discharge Stable          ED Prescriptions       Medication Sig Dispense Start Date End Date Auth. Provider    methocarbamoL (ROBAXIN) 500 MG Tab Take 2 tablets (1,000 mg total) by mouth 3 (three) times daily. for 5 days 30 tablet 12/13/2024 12/18/2024 Richie Garcia MD    predniSONE (DELTASONE) 20 MG tablet Take 1 tablet (20 mg total) by mouth once daily. for 5 days 5 tablet 12/13/2024 12/18/2024 Richie Garcia MD    meloxicam (MOBIC) 7.5 MG tablet Take 1 tablet (7.5 mg total) by mouth once daily. for 10 days 10 tablet 12/13/2024 12/23/2024 Richie Garcia MD          Follow-up Information       Follow up With Specialties Details Why Contact Info    Primary Care  Call in 1 day  Please call [phone] for a primary care provider.    Lazara Rivas MD Neurosurgery Call   [address]  [place] [address] [phone]      Yoana Acosta MD Neurology   [address] [place] [address] [address]  [address]  [place] [address]  [phone]      Kashmir Fulton MD Neurology, Sleep Medicine   [address] [place] [address] Dr  [address]  [place] [address]  [phone]      Jac Martines MD Neurology, Sleep Medicine   [address] [place] [address] [address]  [place] [address]  [phone]               Richie Garcia MD  12/16/24 2358

## 2024-12-14 LAB
OHS QRS DURATION: 94 MS
OHS QTC CALCULATION: 418 MS